# Patient Record
Sex: FEMALE | Race: WHITE | NOT HISPANIC OR LATINO | Employment: FULL TIME | URBAN - METROPOLITAN AREA
[De-identification: names, ages, dates, MRNs, and addresses within clinical notes are randomized per-mention and may not be internally consistent; named-entity substitution may affect disease eponyms.]

---

## 2013-08-16 LAB
CORTISOL: 0.5 MCG/DL (ref 4–22)
EXT 24 HR UR METANEPHRINE: ABNORMAL
EXT 24 HR UR NORMETANEPHRINE: ABNORMAL
EXT 24 HR UR NORMETANEPHRINE: ABNORMAL
EXT 25 HYDROXY VIT D2: ABNORMAL
EXT 25 HYDROXY VIT D3: ABNORMAL
EXT 5 HIAA 24 HR URINE: ABNORMAL
EXT 5 HIAA BLOOD: ABNORMAL
EXT ACTH: ABNORMAL
EXT AFP: ABNORMAL
EXT ALBUMIN: ABNORMAL
EXT ALKALINE PHOSPHATASE: ABNORMAL
EXT ALT: ABNORMAL
EXT AMYLASE: ABNORMAL
EXT ANTI ISLET CELL AB: ABNORMAL
EXT ANTI PARIETAL CELL AB: ABNORMAL
EXT ANTI THYROID AB: ABNORMAL
EXT AST: ABNORMAL
EXT BILIRUBIN DIRECT: ABNORMAL
EXT BILIRUBIN TOTAL: ABNORMAL
EXT BK VIRUS DNA QN PCR: ABNORMAL
EXT BUN: ABNORMAL
EXT C PEPTIDE: ABNORMAL
EXT CA 125: ABNORMAL
EXT CA 19-9: ABNORMAL
EXT CA 27-29: ABNORMAL
EXT CALCITONIN: ABNORMAL
EXT CALCIUM: ABNORMAL
EXT CEA: ABNORMAL
EXT CHLORIDE: ABNORMAL
EXT CHOLESTEROL: ABNORMAL
EXT CHROMOGRANIN A: ABNORMAL
EXT CO2: ABNORMAL
EXT CREATININE UA: ABNORMAL
EXT CREATININE: ABNORMAL
EXT CYCLOSPORONE LEVEL: ABNORMAL
EXT DOPAMINE: ABNORMAL
EXT EBV DNA BY PCR: ABNORMAL
EXT EPINEPHRINE: ABNORMAL
EXT FOLATE: ABNORMAL
EXT FREE T3: ABNORMAL
EXT FREE T4: ABNORMAL
EXT FSH: ABNORMAL
EXT GASTRIN RELEASING PEPTIDE: ABNORMAL
EXT GASTRIN RELEASING PEPTIDE: ABNORMAL
EXT GASTRIN: ABNORMAL
EXT GGT: ABNORMAL
EXT GHRELIN: ABNORMAL
EXT GLUCAGON: ABNORMAL
EXT GLUCOSE: ABNORMAL
EXT GROWTH HORMONE: ABNORMAL
EXT HCV RNA QUANT PCR: ABNORMAL
EXT HDL: ABNORMAL
EXT HEMATOCRIT: ABNORMAL
EXT HEMOGLOBIN A1C: ABNORMAL
EXT HEMOGLOBIN: ABNORMAL
EXT HISTAMINE 24 HR URINE: ABNORMAL
EXT HISTAMINE: ABNORMAL
EXT IGF-1: ABNORMAL
EXT IMMUNKNOW (NON-STIMULATED): ABNORMAL
EXT IMMUNKNOW (STIMULATED): ABNORMAL
EXT INR: ABNORMAL
EXT INSULIN: ABNORMAL
EXT LANREOTIDE LEVEL: ABNORMAL
EXT LDH, TOTAL: ABNORMAL
EXT LDL CHOLESTEROL: ABNORMAL
EXT LIPASE: ABNORMAL
EXT MAGNESIUM: ABNORMAL
EXT METANEPHRINE FREE PLASMA: 911 PG/ML (ref 0–205)
EXT MOTILIN: ABNORMAL
EXT NEUROKININ A CAMB: ABNORMAL
EXT NEUROKININ A ISI: ABNORMAL
EXT NEUROTENSIN: ABNORMAL
EXT NOREPINEPHRINE: ABNORMAL
EXT NORMETANEPHRINE: 911 PG/ML (ref 0–148)
EXT NSE: ABNORMAL
EXT OCTREOTIDE LEVEL: ABNORMAL
EXT PANCREASTATIN CAMB: ABNORMAL
EXT PANCREASTATIN ISI: ABNORMAL
EXT PANCREATIC POLYPEPTIDE: ABNORMAL
EXT PHOSPHORUS: ABNORMAL
EXT PLATELETS: ABNORMAL
EXT POTASSIUM: ABNORMAL
EXT PROGRAF LEVEL: ABNORMAL
EXT PROLACTIN: ABNORMAL
EXT PROTEIN TOTAL: ABNORMAL
EXT PROTEIN UA: ABNORMAL
EXT PT: ABNORMAL
EXT PTH, INTACT: ABNORMAL
EXT PTT: ABNORMAL
EXT RAPAMUNE LEVEL: ABNORMAL
EXT SEROTONIN: ABNORMAL
EXT SODIUM: ABNORMAL
EXT SOMATOSTATIN: ABNORMAL
EXT SUBSTANCE P: ABNORMAL
EXT TRIGLYCERIDES: ABNORMAL
EXT TRYPTASE: ABNORMAL
EXT TSH: ABNORMAL
EXT URIC ACID: ABNORMAL
EXT URINE AMYLASE U/HR: ABNORMAL
EXT URINE AMYLASE U/L: ABNORMAL
EXT VASOACTIVE INTESTINAL POLYPEPTIDE: ABNORMAL
EXT VITAMIN B12: ABNORMAL
EXT VMA 24 HR URINE: ABNORMAL
EXT WBC: ABNORMAL
Lab: <25 PG/ML (ref 0–57)
NEURON SPECIFIC ENOLASE: ABNORMAL

## 2014-01-13 LAB

## 2016-09-01 LAB
ACTH, PLASMA: 31.8 PG/ML (ref 7.2–63.3)
CORTISOL: 11 UG/DL (ref 8–19)
EXT 24 HR UR METANEPHRINE: ABNORMAL
EXT 24 HR UR NORMETANEPHRINE: ABNORMAL
EXT 24 HR UR NORMETANEPHRINE: ABNORMAL
EXT 25 HYDROXY VIT D2: ABNORMAL
EXT 25 HYDROXY VIT D3: ABNORMAL
EXT 5 HIAA 24 HR URINE: ABNORMAL
EXT 5 HIAA BLOOD: ABNORMAL
EXT ACTH: ABNORMAL
EXT AFP: ABNORMAL
EXT ALBUMIN: 4.4 G/DL (ref 3.5–5.5)
EXT ALKALINE PHOSPHATASE: 65 IU/L (ref 39–117)
EXT ALT: 40 IU/L (ref 0–32)
EXT AMYLASE: ABNORMAL
EXT ANTI ISLET CELL AB: ABNORMAL
EXT ANTI PARIETAL CELL AB: ABNORMAL
EXT ANTI THYROID AB: ABNORMAL
EXT AST: 30 IU/L (ref 0–40)
EXT BILIRUBIN DIRECT: ABNORMAL
EXT BILIRUBIN TOTAL: 0.2 MG/DL (ref 0–1.2)
EXT BK VIRUS DNA QN PCR: ABNORMAL
EXT BUN: 14 MG/DL (ref 6–24)
EXT C PEPTIDE: ABNORMAL
EXT CA 125: ABNORMAL
EXT CA 19-9: ABNORMAL
EXT CA 27-29: ABNORMAL
EXT CALCITONIN: ABNORMAL
EXT CALCIUM: 9.1 MG/DL (ref 8.7–10.2)
EXT CEA: ABNORMAL
EXT CHLORIDE: 100 MMOL/L (ref 97–108)
EXT CHOLESTEROL: 205 MG/DL (ref 100–199)
EXT CHROMOGRANIN A: ABNORMAL
EXT CO2: 25 MMOL/L (ref 18–29)
EXT CREATININE UA: ABNORMAL
EXT CREATININE: 0.59 MG/DL (ref 0.57–1)
EXT CYCLOSPORONE LEVEL: ABNORMAL
EXT DOPAMINE: ABNORMAL
EXT EBV DNA BY PCR: ABNORMAL
EXT EPINEPHRINE: ABNORMAL
EXT FOLATE: ABNORMAL
EXT FREE T3: 2.13 PG/ML (ref 1.81–4.06)
EXT FREE T4: 1.22 NG/DL (ref 0.82–1.77)
EXT FSH: ABNORMAL
EXT GASTRIN RELEASING PEPTIDE: ABNORMAL
EXT GASTRIN RELEASING PEPTIDE: ABNORMAL
EXT GASTRIN: ABNORMAL
EXT GGT: ABNORMAL
EXT GHRELIN: ABNORMAL
EXT GLUCAGON: ABNORMAL
EXT GLUCOSE: 123 MG/DL (ref 65–99)
EXT GROWTH HORMONE: ABNORMAL
EXT HCV RNA QUANT PCR: ABNORMAL
EXT HDL: 42 MG/DL (ref 39–?)
EXT HEMATOCRIT: ABNORMAL
EXT HEMOGLOBIN A1C: ABNORMAL
EXT HEMOGLOBIN: ABNORMAL
EXT HISTAMINE 24 HR URINE: ABNORMAL
EXT HISTAMINE: ABNORMAL
EXT IGF-1: ABNORMAL
EXT IMMUNKNOW (NON-STIMULATED): ABNORMAL
EXT IMMUNKNOW (STIMULATED): ABNORMAL
EXT INR: ABNORMAL
EXT INSULIN: ABNORMAL
EXT LANREOTIDE LEVEL: ABNORMAL
EXT LDH, TOTAL: ABNORMAL
EXT LDL CHOLESTEROL: 117 MG/DL (ref 0–99)
EXT LIPASE: ABNORMAL
EXT MAGNESIUM: ABNORMAL
EXT METANEPHRINE FREE PLASMA: <10 PG/ML (ref 0–62)
EXT MOTILIN: ABNORMAL
EXT NEUROKININ A CAMB: ABNORMAL
EXT NEUROKININ A ISI: ABNORMAL
EXT NEUROTENSIN: ABNORMAL
EXT NOREPINEPHRINE: ABNORMAL
EXT NORMETANEPHRINE: 30 PG/ML (ref 0–145)
EXT NSE: ABNORMAL
EXT OCTREOTIDE LEVEL: ABNORMAL
EXT PANCREASTATIN CAMB: ABNORMAL
EXT PANCREASTATIN ISI: ABNORMAL
EXT PANCREATIC POLYPEPTIDE: ABNORMAL
EXT PHOSPHORUS: ABNORMAL
EXT PLATELETS: ABNORMAL
EXT POTASSIUM: 4.6 MMOL/L (ref 3.5–5.2)
EXT PROGRAF LEVEL: ABNORMAL
EXT PROLACTIN: ABNORMAL
EXT PROTEIN TOTAL: 6.9 G/DL (ref 6–8.5)
EXT PROTEIN UA: ABNORMAL
EXT PT: ABNORMAL
EXT PTH, INTACT: ABNORMAL
EXT PTT: ABNORMAL
EXT RAPAMUNE LEVEL: ABNORMAL
EXT SEROTONIN: ABNORMAL
EXT SODIUM: 142 MMOL/L (ref 134–144)
EXT SOMATOSTATIN: ABNORMAL
EXT SUBSTANCE P: ABNORMAL
EXT TRIGLYCERIDES: 232 MG/DL (ref 0–149)
EXT TRYPTASE: ABNORMAL
EXT TSH: ABNORMAL
EXT URIC ACID: ABNORMAL
EXT URINE AMYLASE U/HR: ABNORMAL
EXT URINE AMYLASE U/L: ABNORMAL
EXT VASOACTIVE INTESTINAL POLYPEPTIDE: ABNORMAL
EXT VITAMIN B12: ABNORMAL
EXT VMA 24 HR URINE: ABNORMAL
EXT WBC: ABNORMAL
INSULIN AB SER QL: 28.1 UIU/ML (ref 2.6–24.9)
Lab: 0.08 UG/DL (ref 0.03–0.6)
Lab: 222 NG/DL (ref 21–402)
NEURON SPECIFIC ENOLASE: ABNORMAL
THYROPEROXIDASE AB SERPL-ACNC: 13 IU/ML (ref 0–34)
TSH RECEPTOR ANTIBODY: <0.3 U/L (ref 0–1)

## 2021-07-23 LAB
EXT 24 HR UR METANEPHRINE: ABNORMAL
EXT 24 HR UR NORMETANEPHRINE: ABNORMAL
EXT 24 HR UR NORMETANEPHRINE: ABNORMAL
EXT 25 HYDROXY VIT D2: ABNORMAL
EXT 25 HYDROXY VIT D3: ABNORMAL
EXT 5 HIAA 24 HR URINE: ABNORMAL
EXT 5 HIAA BLOOD: ABNORMAL
EXT ACTH: ABNORMAL
EXT AFP: ABNORMAL
EXT ALBUMIN: 3.9 G/DL (ref 3.6–5.1)
EXT ALKALINE PHOSPHATASE: 69 U/L (ref 37–153)
EXT ALT: 23 U/L (ref 6–29)
EXT AMYLASE: ABNORMAL
EXT ANTI ISLET CELL AB: ABNORMAL
EXT ANTI PARIETAL CELL AB: ABNORMAL
EXT ANTI THYROID AB: ABNORMAL
EXT AST: 18 U/L (ref 10–35)
EXT BILIRUBIN DIRECT: ABNORMAL
EXT BILIRUBIN TOTAL: 0.4 MG/DL (ref 0.2–1.2)
EXT BK VIRUS DNA QN PCR: ABNORMAL
EXT BUN: 12 MG/DL (ref 7–25)
EXT C PEPTIDE: ABNORMAL
EXT CA 125: ABNORMAL
EXT CA 19-9: ABNORMAL
EXT CA 27-29: ABNORMAL
EXT CALCITONIN: ABNORMAL
EXT CALCIUM: 9.3 MG/DL (ref 8.6–10.4)
EXT CEA: ABNORMAL
EXT CHLORIDE: 103 MMOL/L (ref 98–110)
EXT CHOLESTEROL: 201 MG/DL (ref 0–200)
EXT CHROMOGRANIN A: ABNORMAL
EXT CO2: 31 MMOL/L (ref 20–32)
EXT CREATININE UA: ABNORMAL
EXT CREATININE: 0.52 MG/DL (ref 0.5–1.05)
EXT CYCLOSPORONE LEVEL: ABNORMAL
EXT DOPAMINE: ABNORMAL
EXT EBV DNA BY PCR: ABNORMAL
EXT EPINEPHRINE: ABNORMAL
EXT FOLATE: ABNORMAL
EXT FREE T3: 3.5 PG/ML (ref 2.3–4.2)
EXT FREE T4: 1.3 NG/DL (ref 0.8–1.8)
EXT FSH: 38.6 MIU/ML
EXT GASTRIN RELEASING PEPTIDE: ABNORMAL
EXT GASTRIN RELEASING PEPTIDE: ABNORMAL
EXT GASTRIN: ABNORMAL
EXT GGT: ABNORMAL
EXT GHRELIN: ABNORMAL
EXT GLUCAGON: ABNORMAL
EXT GLUCOSE: 103 MG/DL (ref 65–99)
EXT GROWTH HORMONE: ABNORMAL
EXT HCV RNA QUANT PCR: ABNORMAL
EXT HDL: 50 MG/DL (ref 50–?)
EXT HEMATOCRIT: 42.6 % (ref 35–45)
EXT HEMOGLOBIN A1C: 6.1 % (ref 0–5.7)
EXT HEMOGLOBIN: 14.3 G/DL (ref 11.7–15.5)
EXT HISTAMINE 24 HR URINE: ABNORMAL
EXT HISTAMINE: ABNORMAL
EXT IGF-1: ABNORMAL
EXT IMMUNKNOW (NON-STIMULATED): ABNORMAL
EXT IMMUNKNOW (STIMULATED): ABNORMAL
EXT INR: ABNORMAL
EXT INSULIN: ABNORMAL
EXT LANREOTIDE LEVEL: ABNORMAL
EXT LDH, TOTAL: ABNORMAL
EXT LDL CHOLESTEROL: 127 MG/DL (ref 0–100)
EXT LIPASE: ABNORMAL
EXT MAGNESIUM: ABNORMAL
EXT METANEPHRINE FREE PLASMA: <25 PG/ML (ref 0–57)
EXT MOTILIN: ABNORMAL
EXT NEUROKININ A CAMB: ABNORMAL
EXT NEUROKININ A ISI: ABNORMAL
EXT NEUROTENSIN: ABNORMAL
EXT NOREPINEPHRINE: ABNORMAL
EXT NORMETANEPHRINE: 327 PG/ML (ref 0–148)
EXT NSE: ABNORMAL
EXT OCTREOTIDE LEVEL: ABNORMAL
EXT PANCREASTATIN CAMB: ABNORMAL
EXT PANCREASTATIN ISI: ABNORMAL
EXT PANCREATIC POLYPEPTIDE: ABNORMAL
EXT PHOSPHORUS: ABNORMAL
EXT PLATELETS: 282 1000/UL (ref 140–400)
EXT POTASSIUM: 4.4 MMOL/L (ref 3.5–5.3)
EXT PROGRAF LEVEL: ABNORMAL
EXT PROLACTIN: ABNORMAL
EXT PROTEIN TOTAL: 6.6 G/DL (ref 6.1–8.1)
EXT PROTEIN UA: ABNORMAL
EXT PT: ABNORMAL
EXT PTH, INTACT: ABNORMAL
EXT PTT: ABNORMAL
EXT RAPAMUNE LEVEL: ABNORMAL
EXT SEROTONIN: ABNORMAL
EXT SODIUM: 141 MMOL/L (ref 135–146)
EXT SOMATOSTATIN: ABNORMAL
EXT SUBSTANCE P: ABNORMAL
EXT TRIGLYCERIDES: 126 MG/DL (ref 0–150)
EXT TRYPTASE: ABNORMAL
EXT TSH: 1.66 MIU/L (ref 0.4–4.5)
EXT URIC ACID: ABNORMAL
EXT URINE AMYLASE U/HR: ABNORMAL
EXT URINE AMYLASE U/L: ABNORMAL
EXT VASOACTIVE INTESTINAL POLYPEPTIDE: ABNORMAL
EXT VITAMIN B12: 733 PG/ML (ref 200–1100)
EXT VMA 24 HR URINE: ABNORMAL
EXT WBC: 7.1 1000/UL (ref 3.8–10.8)
LH: 18.7 MIU/ML
NEURON SPECIFIC ENOLASE: ABNORMAL
TESTOST FREE SERPL-MCNC: 2.6 PG/ML (ref 0.1–6.4)
TESTOST SERPL-MCNC: 27 NG/DL (ref 2–45)
THYROGLOB SERPL-MCNC: 5.8 NG/ML (ref 2.8–40.9)
THYROGLOBULIN ANTIBODIES: <1 IU/ML (ref 0–1)
THYROPEROXIDASE AB SERPL-ACNC: 1 IU/ML (ref 0–9)
VITAMIN D, 25-OH, TOTAL: 37 NG/ML (ref 30–100)

## 2021-08-09 LAB
EXT 24 HR UR METANEPHRINE: 52 (ref 90–315)
EXT 24 HR UR NORMETANEPHRINE: ABNORMAL
EXT 24 HR UR NORMETANEPHRINE: ABNORMAL
EXT 25 HYDROXY VIT D2: ABNORMAL
EXT 25 HYDROXY VIT D3: ABNORMAL
EXT 5 HIAA 24 HR URINE: ABNORMAL
EXT 5 HIAA BLOOD: ABNORMAL
EXT ACTH: ABNORMAL
EXT AFP: ABNORMAL
EXT ALBUMIN: ABNORMAL
EXT ALKALINE PHOSPHATASE: ABNORMAL
EXT ALT: ABNORMAL
EXT AMYLASE: ABNORMAL
EXT ANTI ISLET CELL AB: ABNORMAL
EXT ANTI PARIETAL CELL AB: ABNORMAL
EXT ANTI THYROID AB: ABNORMAL
EXT AST: ABNORMAL
EXT BILIRUBIN DIRECT: ABNORMAL
EXT BILIRUBIN TOTAL: ABNORMAL
EXT BK VIRUS DNA QN PCR: ABNORMAL
EXT BUN: ABNORMAL
EXT C PEPTIDE: ABNORMAL
EXT CA 125: ABNORMAL
EXT CA 19-9: ABNORMAL
EXT CA 27-29: ABNORMAL
EXT CALCITONIN: ABNORMAL
EXT CALCIUM: ABNORMAL
EXT CEA: ABNORMAL
EXT CHLORIDE: ABNORMAL
EXT CHOLESTEROL: ABNORMAL
EXT CHROMOGRANIN A: ABNORMAL
EXT CO2: ABNORMAL
EXT CREATININE UA: ABNORMAL
EXT CREATININE: ABNORMAL
EXT CYCLOSPORONE LEVEL: ABNORMAL
EXT DOPAMINE: ABNORMAL
EXT EBV DNA BY PCR: ABNORMAL
EXT EPINEPHRINE: ABNORMAL
EXT FOLATE: ABNORMAL
EXT FREE T3: ABNORMAL
EXT FREE T4: ABNORMAL
EXT FSH: ABNORMAL
EXT GASTRIN RELEASING PEPTIDE: ABNORMAL
EXT GASTRIN RELEASING PEPTIDE: ABNORMAL
EXT GASTRIN: ABNORMAL
EXT GGT: ABNORMAL
EXT GHRELIN: ABNORMAL
EXT GLUCAGON: ABNORMAL
EXT GLUCOSE: ABNORMAL
EXT GROWTH HORMONE: ABNORMAL
EXT HCV RNA QUANT PCR: ABNORMAL
EXT HDL: ABNORMAL
EXT HEMATOCRIT: ABNORMAL
EXT HEMOGLOBIN A1C: ABNORMAL
EXT HEMOGLOBIN: ABNORMAL
EXT HISTAMINE 24 HR URINE: ABNORMAL
EXT HISTAMINE: ABNORMAL
EXT IGF-1: ABNORMAL
EXT IMMUNKNOW (NON-STIMULATED): ABNORMAL
EXT IMMUNKNOW (STIMULATED): ABNORMAL
EXT INR: ABNORMAL
EXT INSULIN: ABNORMAL
EXT LANREOTIDE LEVEL: ABNORMAL
EXT LDH, TOTAL: ABNORMAL
EXT LDL CHOLESTEROL: ABNORMAL
EXT LIPASE: ABNORMAL
EXT MAGNESIUM: ABNORMAL
EXT METANEPHRINE FREE PLASMA: ABNORMAL
EXT MOTILIN: ABNORMAL
EXT NEUROKININ A CAMB: ABNORMAL
EXT NEUROKININ A ISI: ABNORMAL
EXT NEUROTENSIN: ABNORMAL
EXT NOREPINEPHRINE: ABNORMAL
EXT NORMETANEPHRINE: 970 (ref 122–676)
EXT NSE: ABNORMAL
EXT OCTREOTIDE LEVEL: ABNORMAL
EXT PANCREASTATIN CAMB: ABNORMAL
EXT PANCREASTATIN ISI: ABNORMAL
EXT PANCREATIC POLYPEPTIDE: ABNORMAL
EXT PHOSPHORUS: ABNORMAL
EXT PLATELETS: ABNORMAL
EXT POTASSIUM: ABNORMAL
EXT PROGRAF LEVEL: ABNORMAL
EXT PROLACTIN: ABNORMAL
EXT PROTEIN TOTAL: ABNORMAL
EXT PROTEIN UA: ABNORMAL
EXT PT: ABNORMAL
EXT PTH, INTACT: ABNORMAL
EXT PTT: ABNORMAL
EXT RAPAMUNE LEVEL: ABNORMAL
EXT SEROTONIN: ABNORMAL
EXT SODIUM: ABNORMAL
EXT SOMATOSTATIN: ABNORMAL
EXT SUBSTANCE P: ABNORMAL
EXT TRIGLYCERIDES: ABNORMAL
EXT TRYPTASE: ABNORMAL
EXT TSH: ABNORMAL
EXT URIC ACID: ABNORMAL
EXT URINE AMYLASE U/HR: ABNORMAL
EXT URINE AMYLASE U/L: ABNORMAL
EXT VASOACTIVE INTESTINAL POLYPEPTIDE: ABNORMAL
EXT VITAMIN B12: ABNORMAL
EXT VMA 24 HR URINE: ABNORMAL
EXT WBC: ABNORMAL
Lab: 1000 ML
Lab: 1022 (ref 224–832)
NEURON SPECIFIC ENOLASE: ABNORMAL

## 2021-08-10 LAB
ACTH, PLASMA: <5 PG/ML (ref 6–50)
CORTISOL: 0.9 MCG/DL (ref 4–22)
EXT 24 HR UR METANEPHRINE: ABNORMAL
EXT 24 HR UR NORMETANEPHRINE: ABNORMAL
EXT 24 HR UR NORMETANEPHRINE: ABNORMAL
EXT 25 HYDROXY VIT D2: ABNORMAL
EXT 25 HYDROXY VIT D3: ABNORMAL
EXT 5 HIAA 24 HR URINE: ABNORMAL
EXT 5 HIAA BLOOD: ABNORMAL
EXT ACTH: ABNORMAL
EXT AFP: ABNORMAL
EXT ALBUMIN: ABNORMAL
EXT ALKALINE PHOSPHATASE: ABNORMAL
EXT ALT: ABNORMAL
EXT AMYLASE: ABNORMAL
EXT ANTI ISLET CELL AB: ABNORMAL
EXT ANTI PARIETAL CELL AB: ABNORMAL
EXT ANTI THYROID AB: ABNORMAL
EXT AST: ABNORMAL
EXT BILIRUBIN DIRECT: ABNORMAL
EXT BILIRUBIN TOTAL: ABNORMAL
EXT BK VIRUS DNA QN PCR: ABNORMAL
EXT BUN: ABNORMAL
EXT C PEPTIDE: ABNORMAL
EXT CA 125: ABNORMAL
EXT CA 19-9: ABNORMAL
EXT CA 27-29: ABNORMAL
EXT CALCITONIN: ABNORMAL
EXT CALCIUM: ABNORMAL
EXT CEA: ABNORMAL
EXT CHLORIDE: ABNORMAL
EXT CHOLESTEROL: ABNORMAL
EXT CHROMOGRANIN A: ABNORMAL
EXT CO2: ABNORMAL
EXT CREATININE UA: ABNORMAL
EXT CREATININE: ABNORMAL
EXT CYCLOSPORONE LEVEL: ABNORMAL
EXT DOPAMINE: ABNORMAL
EXT EBV DNA BY PCR: ABNORMAL
EXT EPINEPHRINE: ABNORMAL
EXT FOLATE: ABNORMAL
EXT FREE T3: ABNORMAL
EXT FREE T4: ABNORMAL
EXT FSH: ABNORMAL
EXT GASTRIN RELEASING PEPTIDE: ABNORMAL
EXT GASTRIN RELEASING PEPTIDE: ABNORMAL
EXT GASTRIN: ABNORMAL
EXT GGT: ABNORMAL
EXT GHRELIN: ABNORMAL
EXT GLUCAGON: ABNORMAL
EXT GLUCOSE: ABNORMAL
EXT GROWTH HORMONE: ABNORMAL
EXT HCV RNA QUANT PCR: ABNORMAL
EXT HDL: ABNORMAL
EXT HEMATOCRIT: ABNORMAL
EXT HEMOGLOBIN A1C: ABNORMAL
EXT HEMOGLOBIN: ABNORMAL
EXT HISTAMINE 24 HR URINE: ABNORMAL
EXT HISTAMINE: ABNORMAL
EXT IGF-1: ABNORMAL
EXT IMMUNKNOW (NON-STIMULATED): ABNORMAL
EXT IMMUNKNOW (STIMULATED): ABNORMAL
EXT INR: ABNORMAL
EXT INSULIN: ABNORMAL
EXT LANREOTIDE LEVEL: ABNORMAL
EXT LDH, TOTAL: ABNORMAL
EXT LDL CHOLESTEROL: ABNORMAL
EXT LIPASE: ABNORMAL
EXT MAGNESIUM: ABNORMAL
EXT METANEPHRINE FREE PLASMA: ABNORMAL
EXT MOTILIN: ABNORMAL
EXT NEUROKININ A CAMB: ABNORMAL
EXT NEUROKININ A ISI: ABNORMAL
EXT NEUROTENSIN: ABNORMAL
EXT NOREPINEPHRINE: ABNORMAL
EXT NORMETANEPHRINE: ABNORMAL
EXT NSE: ABNORMAL
EXT OCTREOTIDE LEVEL: ABNORMAL
EXT PANCREASTATIN CAMB: ABNORMAL
EXT PANCREASTATIN ISI: ABNORMAL
EXT PANCREATIC POLYPEPTIDE: ABNORMAL
EXT PHOSPHORUS: ABNORMAL
EXT PLATELETS: ABNORMAL
EXT POTASSIUM: ABNORMAL
EXT PROGRAF LEVEL: ABNORMAL
EXT PROLACTIN: ABNORMAL
EXT PROTEIN TOTAL: ABNORMAL
EXT PROTEIN UA: ABNORMAL
EXT PT: ABNORMAL
EXT PTH, INTACT: ABNORMAL
EXT PTT: ABNORMAL
EXT RAPAMUNE LEVEL: ABNORMAL
EXT SEROTONIN: ABNORMAL
EXT SODIUM: ABNORMAL
EXT SOMATOSTATIN: ABNORMAL
EXT SUBSTANCE P: ABNORMAL
EXT TRIGLYCERIDES: ABNORMAL
EXT TRYPTASE: ABNORMAL
EXT TSH: ABNORMAL
EXT URIC ACID: ABNORMAL
EXT URINE AMYLASE U/HR: ABNORMAL
EXT URINE AMYLASE U/L: ABNORMAL
EXT VASOACTIVE INTESTINAL POLYPEPTIDE: ABNORMAL
EXT VITAMIN B12: ABNORMAL
EXT VMA 24 HR URINE: ABNORMAL
EXT WBC: ABNORMAL
NEURON SPECIFIC ENOLASE: ABNORMAL

## 2021-08-11 ENCOUNTER — IMMUNIZATION (OUTPATIENT)
Dept: FAMILY MEDICINE | Facility: CLINIC | Age: 56
End: 2021-08-11
Payer: COMMERCIAL

## 2021-08-11 DIAGNOSIS — Z23 NEED FOR VACCINATION: Primary | ICD-10-CM

## 2021-08-11 PROCEDURE — 91300 COVID-19, MRNA, LNP-S, PF, 30 MCG/0.3 ML DOSE VACCINE: ICD-10-PCS | Mod: ,,, | Performed by: FAMILY MEDICINE

## 2021-08-11 PROCEDURE — 91300 COVID-19, MRNA, LNP-S, PF, 30 MCG/0.3 ML DOSE VACCINE: CPT | Mod: ,,, | Performed by: FAMILY MEDICINE

## 2021-08-11 PROCEDURE — 0001A COVID-19, MRNA, LNP-S, PF, 30 MCG/0.3 ML DOSE VACCINE: ICD-10-PCS | Mod: CV19,,, | Performed by: FAMILY MEDICINE

## 2021-08-11 PROCEDURE — 0001A COVID-19, MRNA, LNP-S, PF, 30 MCG/0.3 ML DOSE VACCINE: CPT | Mod: CV19,,, | Performed by: FAMILY MEDICINE

## 2021-08-23 LAB
ANDROST SERPL-MCNC: 74 NG/DL
DHEA-SULFATE: 41 MCG/DL (ref 8–188)
EXT 24 HR UR METANEPHRINE: ABNORMAL
EXT 24 HR UR NORMETANEPHRINE: ABNORMAL
EXT 24 HR UR NORMETANEPHRINE: ABNORMAL
EXT 25 HYDROXY VIT D2: ABNORMAL
EXT 25 HYDROXY VIT D3: ABNORMAL
EXT 5 HIAA 24 HR URINE: ABNORMAL
EXT 5 HIAA BLOOD: ABNORMAL
EXT ACTH: ABNORMAL
EXT AFP: ABNORMAL
EXT ALBUMIN: 4.1 G/DL (ref 3.6–5.1)
EXT ALKALINE PHOSPHATASE: 72 U/L (ref 37–153)
EXT ALT: 24 U/L (ref 6–29)
EXT AMYLASE: ABNORMAL
EXT ANTI ISLET CELL AB: ABNORMAL
EXT ANTI PARIETAL CELL AB: ABNORMAL
EXT ANTI THYROID AB: ABNORMAL
EXT AST: 18 U/L (ref 10–35)
EXT BILIRUBIN DIRECT: 0.3 MG/DL (ref 0.2–1.2)
EXT BILIRUBIN TOTAL: ABNORMAL
EXT BK VIRUS DNA QN PCR: ABNORMAL
EXT BUN: 15 MG/DL (ref 7–25)
EXT C PEPTIDE: ABNORMAL
EXT CA 125: ABNORMAL
EXT CA 19-9: ABNORMAL
EXT CA 27-29: ABNORMAL
EXT CALCITONIN: ABNORMAL
EXT CALCIUM: 9.3 MG/DL (ref 8.6–10.4)
EXT CEA: ABNORMAL
EXT CHLORIDE: 101 MMOL/L (ref 98–110)
EXT CHOLESTEROL: ABNORMAL
EXT CHROMOGRANIN A: 484 NG/ML (ref 0–311)
EXT CO2: 32 MMOL/L (ref 20–32)
EXT CREATININE UA: ABNORMAL
EXT CREATININE: 0.61 MG/DL (ref 0.5–1.05)
EXT CYCLOSPORONE LEVEL: ABNORMAL
EXT DOPAMINE: ABNORMAL
EXT EBV DNA BY PCR: ABNORMAL
EXT EPINEPHRINE: ABNORMAL
EXT FOLATE: ABNORMAL
EXT FREE T3: ABNORMAL
EXT FREE T4: ABNORMAL
EXT FSH: ABNORMAL
EXT GASTRIN RELEASING PEPTIDE: ABNORMAL
EXT GASTRIN RELEASING PEPTIDE: ABNORMAL
EXT GASTRIN: ABNORMAL
EXT GGT: ABNORMAL
EXT GHRELIN: ABNORMAL
EXT GLUCAGON: ABNORMAL
EXT GLUCOSE: 108 MG/DL (ref 65–99)
EXT GROWTH HORMONE: ABNORMAL
EXT HCV RNA QUANT PCR: ABNORMAL
EXT HDL: ABNORMAL
EXT HEMATOCRIT: ABNORMAL
EXT HEMOGLOBIN A1C: ABNORMAL
EXT HEMOGLOBIN: ABNORMAL
EXT HISTAMINE 24 HR URINE: ABNORMAL
EXT HISTAMINE: ABNORMAL
EXT IGF-1: ABNORMAL
EXT IMMUNKNOW (NON-STIMULATED): ABNORMAL
EXT IMMUNKNOW (STIMULATED): ABNORMAL
EXT INR: ABNORMAL
EXT INSULIN: ABNORMAL
EXT LANREOTIDE LEVEL: ABNORMAL
EXT LDH, TOTAL: ABNORMAL
EXT LDL CHOLESTEROL: ABNORMAL
EXT LIPASE: ABNORMAL
EXT MAGNESIUM: ABNORMAL
EXT METANEPHRINE FREE PLASMA: ABNORMAL
EXT MOTILIN: ABNORMAL
EXT NEUROKININ A CAMB: ABNORMAL
EXT NEUROKININ A ISI: ABNORMAL
EXT NEUROTENSIN: ABNORMAL
EXT NOREPINEPHRINE: ABNORMAL
EXT NORMETANEPHRINE: ABNORMAL
EXT NSE: ABNORMAL
EXT OCTREOTIDE LEVEL: ABNORMAL
EXT PANCREASTATIN CAMB: ABNORMAL
EXT PANCREASTATIN ISI: ABNORMAL
EXT PANCREATIC POLYPEPTIDE: ABNORMAL
EXT PHOSPHORUS: ABNORMAL
EXT PLATELETS: ABNORMAL
EXT POTASSIUM: 4.6 MMOL/L (ref 3.5–5.3)
EXT PROGRAF LEVEL: ABNORMAL
EXT PROLACTIN: ABNORMAL
EXT PROTEIN TOTAL: 6.9 G/DL (ref 6.1–8.1)
EXT PROTEIN UA: ABNORMAL
EXT PT: ABNORMAL
EXT PTH, INTACT: ABNORMAL
EXT PTT: ABNORMAL
EXT RAPAMUNE LEVEL: ABNORMAL
EXT SEROTONIN: ABNORMAL
EXT SODIUM: 142 MMOL/L (ref 135–146)
EXT SOMATOSTATIN: ABNORMAL
EXT SUBSTANCE P: ABNORMAL
EXT TRIGLYCERIDES: ABNORMAL
EXT TRYPTASE: ABNORMAL
EXT TSH: ABNORMAL
EXT URIC ACID: ABNORMAL
EXT URINE AMYLASE U/HR: ABNORMAL
EXT URINE AMYLASE U/L: ABNORMAL
EXT VASOACTIVE INTESTINAL POLYPEPTIDE: ABNORMAL
EXT VITAMIN B12: ABNORMAL
EXT VMA 24 HR URINE: ABNORMAL
EXT WBC: ABNORMAL
NEURON SPECIFIC ENOLASE: ABNORMAL
VITAMIN D, 25-OH, TOTAL: 43 NG/ML (ref 30–100)

## 2021-09-11 ENCOUNTER — TELEPHONE (OUTPATIENT)
Dept: FAMILY MEDICINE | Facility: CLINIC | Age: 56
End: 2021-09-11

## 2021-09-15 ENCOUNTER — TELEPHONE (OUTPATIENT)
Dept: NEUROLOGY | Facility: HOSPITAL | Age: 56
End: 2021-09-15

## 2021-09-15 ENCOUNTER — IMMUNIZATION (OUTPATIENT)
Dept: FAMILY MEDICINE | Facility: CLINIC | Age: 56
End: 2021-09-15
Payer: COMMERCIAL

## 2021-09-15 DIAGNOSIS — C74.92: Primary | ICD-10-CM

## 2021-09-15 DIAGNOSIS — Z23 NEED FOR VACCINATION: Primary | ICD-10-CM

## 2021-09-15 PROCEDURE — 0002A COVID-19, MRNA, LNP-S, PF, 30 MCG/0.3 ML DOSE VACCINE: ICD-10-PCS | Mod: CV19,,, | Performed by: FAMILY MEDICINE

## 2021-09-15 PROCEDURE — 91300 COVID-19, MRNA, LNP-S, PF, 30 MCG/0.3 ML DOSE VACCINE: ICD-10-PCS | Mod: ,,, | Performed by: FAMILY MEDICINE

## 2021-09-15 PROCEDURE — 0002A COVID-19, MRNA, LNP-S, PF, 30 MCG/0.3 ML DOSE VACCINE: CPT | Mod: CV19,,, | Performed by: FAMILY MEDICINE

## 2021-09-15 PROCEDURE — 91300 COVID-19, MRNA, LNP-S, PF, 30 MCG/0.3 ML DOSE VACCINE: CPT | Mod: ,,, | Performed by: FAMILY MEDICINE

## 2021-09-16 ENCOUNTER — TELEPHONE (OUTPATIENT)
Dept: NEUROLOGY | Facility: HOSPITAL | Age: 56
End: 2021-09-16

## 2021-10-06 RX ORDER — LISINOPRIL 20 MG/1
TABLET ORAL
Status: ON HOLD | COMMUNITY
Start: 2021-09-14 | End: 2021-10-29 | Stop reason: HOSPADM

## 2021-10-06 RX ORDER — ACETAMINOPHEN 500 MG
TABLET ORAL
COMMUNITY

## 2021-10-06 RX ORDER — LEVOTHYROXINE SODIUM 50 UG/1
TABLET ORAL
COMMUNITY

## 2021-10-06 RX ORDER — DIAPER,BRIEF,ADULT, DISPOSABLE
EACH MISCELLANEOUS
COMMUNITY
End: 2022-01-21

## 2021-10-06 RX ORDER — ESCITALOPRAM OXALATE 10 MG/1
10 TABLET ORAL DAILY
COMMUNITY
Start: 2021-04-21 | End: 2021-10-20

## 2021-10-06 RX ORDER — HYDROCODONE BITARTRATE AND ACETAMINOPHEN 10; 325 MG/1; MG/1
TABLET ORAL
Status: ON HOLD | COMMUNITY
End: 2021-10-29 | Stop reason: HOSPADM

## 2021-10-08 ENCOUNTER — PATIENT MESSAGE (OUTPATIENT)
Dept: NEUROLOGY | Facility: HOSPITAL | Age: 56
End: 2021-10-08

## 2021-10-08 ENCOUNTER — OFFICE VISIT (OUTPATIENT)
Dept: NEUROLOGY | Facility: HOSPITAL | Age: 56
End: 2021-10-08
Attending: SURGERY
Payer: COMMERCIAL

## 2021-10-08 ENCOUNTER — HOSPITAL ENCOUNTER (OUTPATIENT)
Dept: RADIOLOGY | Facility: HOSPITAL | Age: 56
Discharge: HOME | End: 2021-10-08
Attending: SURGERY
Payer: COMMERCIAL

## 2021-10-08 VITALS
BODY MASS INDEX: 41.42 KG/M2 | TEMPERATURE: 98 F | HEART RATE: 75 BPM | WEIGHT: 225.06 LBS | DIASTOLIC BLOOD PRESSURE: 90 MMHG | SYSTOLIC BLOOD PRESSURE: 156 MMHG | HEIGHT: 62 IN

## 2021-10-08 DIAGNOSIS — D35.00 PHEOCHROMOCYTOMA, UNSPECIFIED LATERALITY: Primary | ICD-10-CM

## 2021-10-08 DIAGNOSIS — C74.92: ICD-10-CM

## 2021-10-08 PROCEDURE — 78815 NM PET 68GA DOTATATE WHOLE BODY: ICD-10-PCS | Mod: 26,PS,, | Performed by: RADIOLOGY

## 2021-10-08 PROCEDURE — 78815 PET IMAGE W/CT SKULL-THIGH: CPT | Mod: TC

## 2021-10-08 PROCEDURE — 99214 OFFICE O/P EST MOD 30 MIN: CPT | Mod: 25 | Performed by: SURGERY

## 2021-10-08 PROCEDURE — 78815 PET IMAGE W/CT SKULL-THIGH: CPT | Mod: 26,PS,, | Performed by: RADIOLOGY

## 2021-10-08 RX ORDER — KETOROLAC TROMETHAMINE 15 MG/ML
15 INJECTION, SOLUTION INTRAMUSCULAR; INTRAVENOUS
Status: CANCELLED | OUTPATIENT
Start: 2021-10-08 | End: 2021-10-11

## 2021-10-08 RX ORDER — LIDOCAINE HYDROCHLORIDE 10 MG/ML
1 INJECTION, SOLUTION EPIDURAL; INFILTRATION; INTRACAUDAL; PERINEURAL ONCE
Status: CANCELLED | OUTPATIENT
Start: 2021-10-08 | End: 2021-10-08

## 2021-10-08 RX ORDER — ONDANSETRON 2 MG/ML
8 INJECTION INTRAMUSCULAR; INTRAVENOUS
Status: CANCELLED | OUTPATIENT
Start: 2021-10-08

## 2021-10-08 RX ORDER — ACETAMINOPHEN 500 MG
1000 TABLET ORAL
Status: CANCELLED | OUTPATIENT
Start: 2021-10-08 | End: 2021-10-08

## 2021-10-08 RX ORDER — ENOXAPARIN SODIUM 100 MG/ML
30 INJECTION SUBCUTANEOUS
Status: CANCELLED | OUTPATIENT
Start: 2021-10-08

## 2021-10-08 RX ORDER — PREGABALIN 75 MG/1
75 CAPSULE ORAL
Status: CANCELLED | OUTPATIENT
Start: 2021-10-08

## 2021-10-08 RX ORDER — FAMOTIDINE 10 MG/ML
20 INJECTION INTRAVENOUS
Status: CANCELLED | OUTPATIENT
Start: 2021-10-08

## 2021-10-08 RX ORDER — DIPHENHYDRAMINE HCL 25 MG
TABLET ORAL
COMMUNITY
End: 2022-01-21

## 2021-10-08 RX ORDER — PRAZOSIN HYDROCHLORIDE 2 MG/1
2 CAPSULE ORAL 3 TIMES DAILY
Qty: 90 CAPSULE | Refills: 11 | Status: SHIPPED | OUTPATIENT
Start: 2021-10-08 | End: 2021-10-11

## 2021-10-08 RX ORDER — DICLOFENAC SODIUM 20 MG/G
SOLUTION TOPICAL
COMMUNITY
Start: 2021-09-17

## 2021-10-11 DIAGNOSIS — C74.92: ICD-10-CM

## 2021-10-11 DIAGNOSIS — D35.00 PHEOCHROMOCYTOMA, UNSPECIFIED LATERALITY: Primary | ICD-10-CM

## 2021-10-12 ENCOUNTER — LAB VISIT (OUTPATIENT)
Dept: LAB | Facility: HOSPITAL | Age: 56
End: 2021-10-12
Attending: SURGERY
Payer: COMMERCIAL

## 2021-10-12 DIAGNOSIS — D35.00 PHEOCHROMOCYTOMA, UNSPECIFIED LATERALITY: ICD-10-CM

## 2021-10-12 PROCEDURE — 83835 ASSAY OF METANEPHRINES: CPT | Performed by: SURGERY

## 2021-10-12 PROCEDURE — 86316 IMMUNOASSAY TUMOR OTHER: CPT | Performed by: SURGERY

## 2021-10-12 PROCEDURE — 82384 ASSAY THREE CATECHOLAMINES: CPT | Performed by: SURGERY

## 2021-10-12 RX ORDER — DILTIAZEM HYDROCHLORIDE 60 MG/1
60 CAPSULE, EXTENDED RELEASE ORAL 2 TIMES DAILY
Qty: 60 CAPSULE | Refills: 11 | Status: SHIPPED | OUTPATIENT
Start: 2021-10-12 | End: 2021-10-14

## 2021-10-13 ENCOUNTER — PATIENT MESSAGE (OUTPATIENT)
Dept: NEUROLOGY | Facility: HOSPITAL | Age: 56
End: 2021-10-13
Payer: COMMERCIAL

## 2021-10-14 ENCOUNTER — ANESTHESIA EVENT (OUTPATIENT)
Dept: SURGERY | Facility: HOSPITAL | Age: 56
DRG: 629 | End: 2021-10-14
Payer: COMMERCIAL

## 2021-10-14 DIAGNOSIS — D35.00 PHEOCHROMOCYTOMA, UNSPECIFIED LATERALITY: Primary | ICD-10-CM

## 2021-10-14 DIAGNOSIS — C74.92: ICD-10-CM

## 2021-10-15 ENCOUNTER — PATIENT MESSAGE (OUTPATIENT)
Dept: NEUROLOGY | Facility: HOSPITAL | Age: 56
End: 2021-10-15
Payer: COMMERCIAL

## 2021-10-15 RX ORDER — DILTIAZEM HYDROCHLORIDE 60 MG/1
60 TABLET, FILM COATED ORAL EVERY 12 HOURS
Qty: 60 TABLET | Refills: 11 | Status: SHIPPED | OUTPATIENT
Start: 2021-10-15 | End: 2022-01-21

## 2021-10-19 ENCOUNTER — HOSPITAL ENCOUNTER (OUTPATIENT)
Dept: RADIOLOGY | Facility: HOSPITAL | Age: 56
Discharge: HOME | End: 2021-10-19
Attending: SURGERY
Payer: COMMERCIAL

## 2021-10-19 DIAGNOSIS — D35.00 PHEOCHROMOCYTOMA, UNSPECIFIED LATERALITY: ICD-10-CM

## 2021-10-19 PROCEDURE — A9582 IODINE I-123 IOBENGUANE: HCPCS

## 2021-10-19 PROCEDURE — 78803 RP LOCLZJ TUM SPECT 1 AREA: CPT | Mod: TC

## 2021-10-19 PROCEDURE — 78803 RP LOCLZJ TUM SPECT 1 AREA: CPT | Mod: 26,,, | Performed by: RADIOLOGY

## 2021-10-19 PROCEDURE — 78803 NM TUMOR LOCALIZATION SPECT: ICD-10-PCS | Mod: 26,,, | Performed by: RADIOLOGY

## 2021-10-19 PROCEDURE — 78804 RP LOCLZJ TUM WHBDY 2+D IMG: CPT | Mod: 26,,, | Performed by: RADIOLOGY

## 2021-10-19 PROCEDURE — 78804 NM TUMOR LOCALIZATION MULTI MIBG: ICD-10-PCS | Mod: 26,,, | Performed by: RADIOLOGY

## 2021-10-20 ENCOUNTER — HOSPITAL ENCOUNTER (OUTPATIENT)
Dept: PREADMISSION TESTING | Facility: HOSPITAL | Age: 56
Discharge: HOME | End: 2021-10-20
Attending: SURGERY
Payer: COMMERCIAL

## 2021-10-20 ENCOUNTER — HOSPITAL ENCOUNTER (OUTPATIENT)
Dept: RADIOLOGY | Facility: HOSPITAL | Age: 56
Discharge: HOME | End: 2021-10-20
Attending: SURGERY
Payer: COMMERCIAL

## 2021-10-20 ENCOUNTER — OFFICE VISIT (OUTPATIENT)
Dept: NEUROLOGY | Facility: HOSPITAL | Age: 56
End: 2021-10-20
Attending: SURGERY
Payer: COMMERCIAL

## 2021-10-20 VITALS — DIASTOLIC BLOOD PRESSURE: 75 MMHG | SYSTOLIC BLOOD PRESSURE: 161 MMHG | HEART RATE: 68 BPM | OXYGEN SATURATION: 97 %

## 2021-10-20 VITALS
SYSTOLIC BLOOD PRESSURE: 124 MMHG | HEART RATE: 67 BPM | DIASTOLIC BLOOD PRESSURE: 82 MMHG | HEIGHT: 62 IN | WEIGHT: 225.31 LBS | TEMPERATURE: 98 F | BODY MASS INDEX: 41.46 KG/M2

## 2021-10-20 DIAGNOSIS — D35.02 PHEOCHROMOCYTOMA OF LEFT ADRENAL GLAND: ICD-10-CM

## 2021-10-20 LAB
METANEPH FREE SERPL-MCNC: <25 PG/ML
METANEPHS SERPL-MCNC: 317 PG/ML
NORMETANEPH FREE SERPL-MCNC: 317 PG/ML

## 2021-10-20 PROCEDURE — 99215 OFFICE O/P EST HI 40 MIN: CPT | Performed by: SURGERY

## 2021-10-20 RX ORDER — SCOLOPAMINE TRANSDERMAL SYSTEM 1 MG/1
1 PATCH, EXTENDED RELEASE TRANSDERMAL
Status: CANCELLED | OUTPATIENT
Start: 2021-10-20

## 2021-10-20 RX ORDER — SODIUM CHLORIDE, SODIUM LACTATE, POTASSIUM CHLORIDE, CALCIUM CHLORIDE 600; 310; 30; 20 MG/100ML; MG/100ML; MG/100ML; MG/100ML
INJECTION, SOLUTION INTRAVENOUS CONTINUOUS
Status: CANCELLED | OUTPATIENT
Start: 2021-10-20

## 2021-10-20 RX ORDER — LIDOCAINE HYDROCHLORIDE 10 MG/ML
1 INJECTION, SOLUTION EPIDURAL; INFILTRATION; INTRACAUDAL; PERINEURAL ONCE
Status: CANCELLED | OUTPATIENT
Start: 2021-10-20 | End: 2021-10-20

## 2021-10-21 ENCOUNTER — PATIENT MESSAGE (OUTPATIENT)
Dept: SURGERY | Facility: HOSPITAL | Age: 56
End: 2021-10-21
Payer: COMMERCIAL

## 2021-10-21 DIAGNOSIS — D35.02 PHEOCHROMOCYTOMA OF LEFT ADRENAL GLAND: Primary | ICD-10-CM

## 2021-10-21 DIAGNOSIS — D35.00 PHEOCHROMOCYTOMA, UNSPECIFIED LATERALITY: ICD-10-CM

## 2021-10-21 LAB
CATECHOLS PLAS-MCNC: 726 PG/ML
DOPAMINE SERPL-MCNC: <20 PG/ML
EPINEPH PLAS-MCNC: <40 PG/ML
NOREPINEPH PLAS-MCNC: 726 PG/ML

## 2021-10-21 RX ORDER — FLUCONAZOLE 150 MG/1
150 TABLET ORAL DAILY
Qty: 7 TABLET | Refills: 0 | Status: ON HOLD | OUTPATIENT
Start: 2021-10-21 | End: 2021-10-29 | Stop reason: HOSPADM

## 2021-10-25 ENCOUNTER — CLINICAL SUPPORT (OUTPATIENT)
Dept: SMOKING CESSATION | Facility: CLINIC | Age: 56
End: 2021-10-25

## 2021-10-25 ENCOUNTER — ANESTHESIA (OUTPATIENT)
Dept: SURGERY | Facility: HOSPITAL | Age: 56
DRG: 629 | End: 2021-10-25
Payer: COMMERCIAL

## 2021-10-25 ENCOUNTER — HOSPITAL ENCOUNTER (INPATIENT)
Facility: HOSPITAL | Age: 56
LOS: 4 days | Discharge: HOME | DRG: 629 | End: 2021-10-29
Attending: SURGERY | Admitting: SURGERY
Payer: COMMERCIAL

## 2021-10-25 DIAGNOSIS — F17.210 CIGARETTE SMOKER: Primary | ICD-10-CM

## 2021-10-25 DIAGNOSIS — D35.00 PHEOCHROMOCYTOMA, UNSPECIFIED LATERALITY: ICD-10-CM

## 2021-10-25 DIAGNOSIS — D35.02 PHEOCHROMOCYTOMA OF LEFT ADRENAL GLAND: ICD-10-CM

## 2021-10-25 DIAGNOSIS — C7B.8 METASTATIC MALIGNANT NEUROENDOCRINE TUMOR TO LYMPH NODE: Primary | ICD-10-CM

## 2021-10-25 LAB
ABO + RH BLD: NORMAL
ANION GAP SERPL CALC-SCNC: 10 MMOL/L (ref 8–16)
BASOPHILS # BLD AUTO: 0.05 K/UL (ref 0–0.2)
BASOPHILS NFR BLD: 0.2 % (ref 0–1.9)
BLD GP AB SCN CELLS X3 SERPL QL: NORMAL
BUN SERPL-MCNC: 10 MG/DL (ref 6–20)
CALCIUM SERPL-MCNC: 7.5 MG/DL (ref 8.7–10.5)
CHLORIDE SERPL-SCNC: 108 MMOL/L (ref 95–110)
CO2 SERPL-SCNC: 22 MMOL/L (ref 23–29)
CREAT SERPL-MCNC: 0.6 MG/DL (ref 0.5–1.4)
DIFFERENTIAL METHOD: ABNORMAL
EOSINOPHIL # BLD AUTO: 0 K/UL (ref 0–0.5)
EOSINOPHIL NFR BLD: 0 % (ref 0–8)
ERYTHROCYTE [DISTWIDTH] IN BLOOD BY AUTOMATED COUNT: 12.9 % (ref 11.5–14.5)
EST. GFR  (AFRICAN AMERICAN): >60 ML/MIN/1.73 M^2
EST. GFR  (NON AFRICAN AMERICAN): >60 ML/MIN/1.73 M^2
GLUCOSE SERPL-MCNC: 161 MG/DL (ref 70–110)
HCT VFR BLD AUTO: 37 % (ref 37–48.5)
HGB BLD-MCNC: 12.7 G/DL (ref 12–16)
IMM GRANULOCYTES # BLD AUTO: 0.15 K/UL (ref 0–0.04)
IMM GRANULOCYTES NFR BLD AUTO: 0.7 % (ref 0–0.5)
LYMPHOCYTES # BLD AUTO: 1.1 K/UL (ref 1–4.8)
LYMPHOCYTES NFR BLD: 5 % (ref 18–48)
MCH RBC QN AUTO: 31.6 PG (ref 27–31)
MCHC RBC AUTO-ENTMCNC: 34.3 G/DL (ref 32–36)
MCV RBC AUTO: 92 FL (ref 82–98)
MONOCYTES # BLD AUTO: 1.4 K/UL (ref 0.3–1)
MONOCYTES NFR BLD: 6.4 % (ref 4–15)
NEUTROPHILS # BLD AUTO: 19.6 K/UL (ref 1.8–7.7)
NEUTROPHILS NFR BLD: 87.7 % (ref 38–73)
NRBC BLD-RTO: 0 /100 WBC
PLATELET # BLD AUTO: 288 K/UL (ref 150–450)
PMV BLD AUTO: 10.6 FL (ref 9.2–12.9)
POTASSIUM SERPL-SCNC: 3.9 MMOL/L (ref 3.5–5.1)
RBC # BLD AUTO: 4.02 M/UL (ref 4–5.4)
SODIUM SERPL-SCNC: 140 MMOL/L (ref 136–145)
WBC # BLD AUTO: 22.35 K/UL (ref 3.9–12.7)

## 2021-10-25 PROCEDURE — 36000707: Performed by: SURGERY

## 2021-10-25 PROCEDURE — 25000003 PHARM REV CODE 250: Performed by: STUDENT IN AN ORGANIZED HEALTH CARE EDUCATION/TRAINING PROGRAM

## 2021-10-25 PROCEDURE — S0030 INJECTION, METRONIDAZOLE: HCPCS | Performed by: SURGERY

## 2021-10-25 PROCEDURE — 88342 CHG IMMUNOCYTOCHEMISTRY: ICD-10-PCS | Mod: 26,,, | Performed by: PATHOLOGY

## 2021-10-25 PROCEDURE — 88331 PATH CONSLTJ SURG 1 BLK 1SPC: CPT | Mod: 26,,, | Performed by: PATHOLOGY

## 2021-10-25 PROCEDURE — 88305 TISSUE EXAM BY PATHOLOGIST: CPT | Mod: 26,,, | Performed by: PATHOLOGY

## 2021-10-25 PROCEDURE — 88331 PR  PATH CONSULT IN SURG,W FRZ SEC: ICD-10-PCS | Mod: 26,,, | Performed by: PATHOLOGY

## 2021-10-25 PROCEDURE — 37000009 HC ANESTHESIA EA ADD 15 MINS: Performed by: SURGERY

## 2021-10-25 PROCEDURE — 63600175 PHARM REV CODE 636 W HCPCS: Performed by: STUDENT IN AN ORGANIZED HEALTH CARE EDUCATION/TRAINING PROGRAM

## 2021-10-25 PROCEDURE — 88341 PR IHC OR ICC EACH ADD'L SINGLE ANTIBODY  STAINPR: ICD-10-PCS | Mod: 26,,, | Performed by: PATHOLOGY

## 2021-10-25 PROCEDURE — 25000003 PHARM REV CODE 250: Performed by: NURSE PRACTITIONER

## 2021-10-25 PROCEDURE — 86900 BLOOD TYPING SEROLOGIC ABO: CPT | Performed by: SURGERY

## 2021-10-25 PROCEDURE — 88313 SPECIAL STAINS GROUP 2: CPT | Performed by: PATHOLOGY

## 2021-10-25 PROCEDURE — 63600175 PHARM REV CODE 636 W HCPCS: Mod: JG | Performed by: STUDENT IN AN ORGANIZED HEALTH CARE EDUCATION/TRAINING PROGRAM

## 2021-10-25 PROCEDURE — 88307 PR  SURG PATH,LEVEL V: ICD-10-PCS | Mod: 26,,, | Performed by: PATHOLOGY

## 2021-10-25 PROCEDURE — 85025 COMPLETE CBC W/AUTO DIFF WBC: CPT | Performed by: SURGERY

## 2021-10-25 PROCEDURE — 37000008 HC ANESTHESIA 1ST 15 MINUTES: Performed by: SURGERY

## 2021-10-25 PROCEDURE — 88313 PR  SPECIAL STAINS,GROUP II: ICD-10-PCS | Mod: 26,,, | Performed by: PATHOLOGY

## 2021-10-25 PROCEDURE — 20000000 HC ICU ROOM

## 2021-10-25 PROCEDURE — 25000003 PHARM REV CODE 250: Performed by: SURGERY

## 2021-10-25 PROCEDURE — 36000706: Performed by: SURGERY

## 2021-10-25 PROCEDURE — 99406 PT REFUSED TOBACCO CESSATION: ICD-10-PCS | Mod: S$GLB,,,

## 2021-10-25 PROCEDURE — 88331 PATH CONSLTJ SURG 1 BLK 1SPC: CPT | Mod: 59 | Performed by: PATHOLOGY

## 2021-10-25 PROCEDURE — 88307 TISSUE EXAM BY PATHOLOGIST: CPT | Mod: 26,,, | Performed by: PATHOLOGY

## 2021-10-25 PROCEDURE — 36415 COLL VENOUS BLD VENIPUNCTURE: CPT | Performed by: SURGERY

## 2021-10-25 PROCEDURE — 27201423 OPTIME MED/SURG SUP & DEVICES STERILE SUPPLY: Performed by: SURGERY

## 2021-10-25 PROCEDURE — 63600175 PHARM REV CODE 636 W HCPCS: Performed by: SURGERY

## 2021-10-25 PROCEDURE — 88341 IMHCHEM/IMCYTCHM EA ADD ANTB: CPT | Mod: 26,,, | Performed by: PATHOLOGY

## 2021-10-25 PROCEDURE — 88341 IMHCHEM/IMCYTCHM EA ADD ANTB: CPT | Mod: 59 | Performed by: PATHOLOGY

## 2021-10-25 PROCEDURE — C9290 INJ, BUPIVACAINE LIPOSOME: HCPCS | Performed by: STUDENT IN AN ORGANIZED HEALTH CARE EDUCATION/TRAINING PROGRAM

## 2021-10-25 PROCEDURE — 88305 TISSUE EXAM BY PATHOLOGIST: CPT | Mod: 59 | Performed by: PATHOLOGY

## 2021-10-25 PROCEDURE — 88342 IMHCHEM/IMCYTCHM 1ST ANTB: CPT | Mod: 26,,, | Performed by: PATHOLOGY

## 2021-10-25 PROCEDURE — 88307 TISSUE EXAM BY PATHOLOGIST: CPT | Performed by: PATHOLOGY

## 2021-10-25 PROCEDURE — 99406 BEHAV CHNG SMOKING 3-10 MIN: CPT | Mod: S$GLB,,,

## 2021-10-25 PROCEDURE — 88313 SPECIAL STAINS GROUP 2: CPT | Mod: 26,,, | Performed by: PATHOLOGY

## 2021-10-25 PROCEDURE — 88305 TISSUE EXAM BY PATHOLOGIST: ICD-10-PCS | Mod: 26,,, | Performed by: PATHOLOGY

## 2021-10-25 PROCEDURE — 80048 BASIC METABOLIC PNL TOTAL CA: CPT | Performed by: SURGERY

## 2021-10-25 PROCEDURE — 88342 IMHCHEM/IMCYTCHM 1ST ANTB: CPT | Mod: 59 | Performed by: PATHOLOGY

## 2021-10-25 PROCEDURE — 27000221 HC OXYGEN, UP TO 24 HOURS

## 2021-10-25 PROCEDURE — 94760 N-INVAS EAR/PLS OXIMETRY 1: CPT

## 2021-10-25 RX ORDER — NICARDIPINE HYDROCHLORIDE 0.2 MG/ML
0-15 INJECTION INTRAVENOUS CONTINUOUS
Status: DISCONTINUED | OUTPATIENT
Start: 2021-10-25 | End: 2021-10-25

## 2021-10-25 RX ORDER — HYDRALAZINE HYDROCHLORIDE 20 MG/ML
5 INJECTION INTRAMUSCULAR; INTRAVENOUS
Status: DISCONTINUED | OUTPATIENT
Start: 2021-10-25 | End: 2021-10-29 | Stop reason: HOSPADM

## 2021-10-25 RX ORDER — LIDOCAINE HYDROCHLORIDE 10 MG/ML
1 INJECTION, SOLUTION EPIDURAL; INFILTRATION; INTRACAUDAL; PERINEURAL ONCE
Status: DISCONTINUED | OUTPATIENT
Start: 2021-10-25 | End: 2021-10-25 | Stop reason: HOSPADM

## 2021-10-25 RX ORDER — DIPHENHYDRAMINE HYDROCHLORIDE 50 MG/ML
12.5 INJECTION INTRAMUSCULAR; INTRAVENOUS EVERY 4 HOURS PRN
Status: DISCONTINUED | OUTPATIENT
Start: 2021-10-25 | End: 2021-10-29 | Stop reason: HOSPADM

## 2021-10-25 RX ORDER — FENTANYL CITRATE 50 UG/ML
INJECTION, SOLUTION INTRAMUSCULAR; INTRAVENOUS
Status: DISCONTINUED | OUTPATIENT
Start: 2021-10-25 | End: 2021-10-25

## 2021-10-25 RX ORDER — ACETAMINOPHEN 500 MG
1000 TABLET ORAL
Status: COMPLETED | OUTPATIENT
Start: 2021-10-25 | End: 2021-10-25

## 2021-10-25 RX ORDER — PREGABALIN 75 MG/1
75 CAPSULE ORAL 2 TIMES DAILY
Status: DISCONTINUED | OUTPATIENT
Start: 2021-10-25 | End: 2021-10-29 | Stop reason: HOSPADM

## 2021-10-25 RX ORDER — HYDROMORPHONE HYDROCHLORIDE 1 MG/ML
0.2 INJECTION, SOLUTION INTRAMUSCULAR; INTRAVENOUS; SUBCUTANEOUS ONCE
Status: DISCONTINUED | OUTPATIENT
Start: 2021-10-25 | End: 2021-10-26

## 2021-10-25 RX ORDER — SODIUM CHLORIDE 0.9 % (FLUSH) 0.9 %
3 SYRINGE (ML) INJECTION
Status: DISCONTINUED | OUTPATIENT
Start: 2021-10-25 | End: 2021-10-25

## 2021-10-25 RX ORDER — BUPIVACAINE HYDROCHLORIDE 2.5 MG/ML
INJECTION, SOLUTION EPIDURAL; INFILTRATION; INTRACAUDAL
Status: DISCONTINUED | OUTPATIENT
Start: 2021-10-25 | End: 2021-10-25 | Stop reason: HOSPADM

## 2021-10-25 RX ORDER — FAMOTIDINE 10 MG/ML
20 INJECTION INTRAVENOUS
Status: COMPLETED | OUTPATIENT
Start: 2021-10-25 | End: 2021-10-25

## 2021-10-25 RX ORDER — ONDANSETRON 2 MG/ML
8 INJECTION INTRAMUSCULAR; INTRAVENOUS
Status: COMPLETED | OUTPATIENT
Start: 2021-10-25 | End: 2021-10-25

## 2021-10-25 RX ORDER — METRONIDAZOLE 500 MG/100ML
500 INJECTION, SOLUTION INTRAVENOUS
Status: COMPLETED | OUTPATIENT
Start: 2021-10-25 | End: 2021-10-25

## 2021-10-25 RX ORDER — ACETAMINOPHEN 500 MG
1000 TABLET ORAL EVERY 8 HOURS
Status: COMPLETED | OUTPATIENT
Start: 2021-10-26 | End: 2021-10-27

## 2021-10-25 RX ORDER — SODIUM NITROPRUSSIDE 25 MG/ML
INJECTION INTRAVENOUS
Status: DISCONTINUED | OUTPATIENT
Start: 2021-10-25 | End: 2021-10-25

## 2021-10-25 RX ORDER — PREGABALIN 75 MG/1
75 CAPSULE ORAL
Status: DISCONTINUED | OUTPATIENT
Start: 2021-10-25 | End: 2021-10-25 | Stop reason: HOSPADM

## 2021-10-25 RX ORDER — KETOROLAC TROMETHAMINE 30 MG/ML
15 INJECTION, SOLUTION INTRAMUSCULAR; INTRAVENOUS
Status: DISCONTINUED | OUTPATIENT
Start: 2021-10-25 | End: 2021-10-25 | Stop reason: HOSPADM

## 2021-10-25 RX ORDER — HYDROMORPHONE HCL IN 0.9% NACL 6 MG/30 ML
PATIENT CONTROLLED ANALGESIA SYRINGE INTRAVENOUS CONTINUOUS
Status: DISCONTINUED | OUTPATIENT
Start: 2021-10-25 | End: 2021-10-28

## 2021-10-25 RX ORDER — LIDOCAINE HYDROCHLORIDE 20 MG/ML
INJECTION INTRAVENOUS
Status: DISCONTINUED | OUTPATIENT
Start: 2021-10-25 | End: 2021-10-25

## 2021-10-25 RX ORDER — ROCURONIUM BROMIDE 10 MG/ML
INJECTION, SOLUTION INTRAVENOUS
Status: DISCONTINUED | OUTPATIENT
Start: 2021-10-25 | End: 2021-10-25

## 2021-10-25 RX ORDER — ONDANSETRON 2 MG/ML
4 INJECTION INTRAMUSCULAR; INTRAVENOUS EVERY 6 HOURS PRN
Status: DISCONTINUED | OUTPATIENT
Start: 2021-10-25 | End: 2021-10-29 | Stop reason: HOSPADM

## 2021-10-25 RX ORDER — SCOLOPAMINE TRANSDERMAL SYSTEM 1 MG/1
1 PATCH, EXTENDED RELEASE TRANSDERMAL
Status: DISCONTINUED | OUTPATIENT
Start: 2021-10-25 | End: 2021-10-25 | Stop reason: HOSPADM

## 2021-10-25 RX ORDER — ENOXAPARIN SODIUM 100 MG/ML
30 INJECTION SUBCUTANEOUS
Status: COMPLETED | OUTPATIENT
Start: 2021-10-25 | End: 2021-10-25

## 2021-10-25 RX ORDER — SODIUM CHLORIDE, SODIUM LACTATE, POTASSIUM CHLORIDE, CALCIUM CHLORIDE 600; 310; 30; 20 MG/100ML; MG/100ML; MG/100ML; MG/100ML
INJECTION, SOLUTION INTRAVENOUS CONTINUOUS PRN
Status: DISCONTINUED | OUTPATIENT
Start: 2021-10-25 | End: 2021-10-25

## 2021-10-25 RX ORDER — LABETALOL HYDROCHLORIDE 5 MG/ML
5 INJECTION, SOLUTION INTRAVENOUS
Status: DISCONTINUED | OUTPATIENT
Start: 2021-10-25 | End: 2021-10-29 | Stop reason: HOSPADM

## 2021-10-25 RX ORDER — VASOPRESSIN 20 [USP'U]/ML
INJECTION, SOLUTION INTRAMUSCULAR; SUBCUTANEOUS
Status: DISCONTINUED | OUTPATIENT
Start: 2021-10-25 | End: 2021-10-25

## 2021-10-25 RX ORDER — NALOXONE HCL 0.4 MG/ML
0.02 VIAL (ML) INJECTION
Status: DISCONTINUED | OUTPATIENT
Start: 2021-10-25 | End: 2021-10-29 | Stop reason: HOSPADM

## 2021-10-25 RX ORDER — SODIUM CHLORIDE, SODIUM LACTATE, POTASSIUM CHLORIDE, CALCIUM CHLORIDE 600; 310; 30; 20 MG/100ML; MG/100ML; MG/100ML; MG/100ML
INJECTION, SOLUTION INTRAVENOUS CONTINUOUS
Status: DISCONTINUED | OUTPATIENT
Start: 2021-10-25 | End: 2021-10-26

## 2021-10-25 RX ORDER — DEXTROSE MONOHYDRATE, SODIUM CHLORIDE, AND POTASSIUM CHLORIDE 50; 1.49; 4.5 G/1000ML; G/1000ML; G/1000ML
INJECTION, SOLUTION INTRAVENOUS CONTINUOUS
Status: DISCONTINUED | OUTPATIENT
Start: 2021-10-25 | End: 2021-10-29

## 2021-10-25 RX ORDER — SODIUM CHLORIDE 0.9 % (FLUSH) 0.9 %
.1-1 SYRINGE (ML) INJECTION
Status: DISCONTINUED | OUTPATIENT
Start: 2021-10-25 | End: 2021-10-25

## 2021-10-25 RX ORDER — MUPIROCIN 20 MG/G
OINTMENT TOPICAL 2 TIMES DAILY
Status: DISCONTINUED | OUTPATIENT
Start: 2021-10-25 | End: 2021-10-29 | Stop reason: HOSPADM

## 2021-10-25 RX ORDER — PROPOFOL 10 MG/ML
VIAL (ML) INTRAVENOUS
Status: DISCONTINUED | OUTPATIENT
Start: 2021-10-25 | End: 2021-10-25

## 2021-10-25 RX ORDER — KETAMINE HCL IN 0.9 % NACL 50 MG/5 ML
SYRINGE (ML) INTRAVENOUS
Status: DISCONTINUED | OUTPATIENT
Start: 2021-10-25 | End: 2021-10-25

## 2021-10-25 RX ORDER — VANCOMYCIN HYDROCHLORIDE 1 G/20ML
INJECTION, POWDER, LYOPHILIZED, FOR SOLUTION INTRAVENOUS
Status: DISCONTINUED | OUTPATIENT
Start: 2021-10-25 | End: 2021-10-25 | Stop reason: HOSPADM

## 2021-10-25 RX ORDER — HYDROMORPHONE HYDROCHLORIDE 2 MG/ML
INJECTION, SOLUTION INTRAMUSCULAR; INTRAVENOUS; SUBCUTANEOUS
Status: DISCONTINUED | OUTPATIENT
Start: 2021-10-25 | End: 2021-10-25

## 2021-10-25 RX ORDER — KETOROLAC TROMETHAMINE 30 MG/ML
10 INJECTION, SOLUTION INTRAMUSCULAR; INTRAVENOUS EVERY 6 HOURS
Status: COMPLETED | OUTPATIENT
Start: 2021-10-25 | End: 2021-10-27

## 2021-10-25 RX ORDER — ALBUTEROL SULFATE 2.5 MG/.5ML
2.5 SOLUTION RESPIRATORY (INHALATION) EVERY 4 HOURS PRN
Status: DISCONTINUED | OUTPATIENT
Start: 2021-10-25 | End: 2021-10-29 | Stop reason: HOSPADM

## 2021-10-25 RX ORDER — MIDAZOLAM HYDROCHLORIDE 1 MG/ML
INJECTION INTRAMUSCULAR; INTRAVENOUS
Status: DISCONTINUED | OUTPATIENT
Start: 2021-10-25 | End: 2021-10-25

## 2021-10-25 RX ORDER — BUPIVACAINE HYDROCHLORIDE 5 MG/ML
INJECTION, SOLUTION EPIDURAL; INTRACAUDAL
Status: DISCONTINUED | OUTPATIENT
Start: 2021-10-25 | End: 2021-10-25

## 2021-10-25 RX ORDER — SODIUM CHLORIDE 9 MG/ML
INJECTION, SOLUTION INTRAVENOUS
Status: DISCONTINUED | OUTPATIENT
Start: 2021-10-25 | End: 2021-10-29 | Stop reason: HOSPADM

## 2021-10-25 RX ORDER — PHENYLEPHRINE HYDROCHLORIDE 10 MG/ML
INJECTION INTRAVENOUS
Status: DISCONTINUED | OUTPATIENT
Start: 2021-10-25 | End: 2021-10-25

## 2021-10-25 RX ORDER — MAGNESIUM SULFATE HEPTAHYDRATE 40 MG/ML
2 INJECTION, SOLUTION INTRAVENOUS ONCE
Status: COMPLETED | OUTPATIENT
Start: 2021-10-25 | End: 2021-10-25

## 2021-10-25 RX ORDER — CIPROFLOXACIN 2 MG/ML
400 INJECTION, SOLUTION INTRAVENOUS ONCE
Status: COMPLETED | OUTPATIENT
Start: 2021-10-25 | End: 2021-10-25

## 2021-10-25 RX ORDER — VECURONIUM BROMIDE FOR INJECTION 1 MG/ML
INJECTION, POWDER, LYOPHILIZED, FOR SOLUTION INTRAVENOUS
Status: DISCONTINUED | OUTPATIENT
Start: 2021-10-25 | End: 2021-10-25

## 2021-10-25 RX ORDER — ACETAMINOPHEN 10 MG/ML
1000 INJECTION, SOLUTION INTRAVENOUS EVERY 8 HOURS
Status: COMPLETED | OUTPATIENT
Start: 2021-10-25 | End: 2021-10-26

## 2021-10-25 RX ORDER — PHENTOLAMINE MESYLATE 5 MG/1
20 INJECTION INTRAMUSCULAR; INTRAVENOUS ONCE
Status: COMPLETED | OUTPATIENT
Start: 2021-10-25 | End: 2021-10-25

## 2021-10-25 RX ORDER — ONDANSETRON 2 MG/ML
INJECTION INTRAMUSCULAR; INTRAVENOUS
Status: DISCONTINUED | OUTPATIENT
Start: 2021-10-25 | End: 2021-10-25

## 2021-10-25 RX ADMIN — PHENYLEPHRINE HYDROCHLORIDE 200 MCG: 10 INJECTION INTRAVENOUS at 08:10

## 2021-10-25 RX ADMIN — SODIUM NITROPRUSSIDE 200 MCG: 25 INJECTION, SOLUTION, CONCENTRATE INTRAVENOUS at 10:10

## 2021-10-25 RX ADMIN — BUPIVACAINE HYDROCHLORIDE 20 ML: 5 INJECTION, SOLUTION EPIDURAL; INTRACAUDAL at 06:10

## 2021-10-25 RX ADMIN — FENTANYL CITRATE 100 MCG: 50 INJECTION, SOLUTION INTRAMUSCULAR; INTRAVENOUS at 09:10

## 2021-10-25 RX ADMIN — SODIUM NITROPRUSSIDE 100 MCG: 25 INJECTION, SOLUTION, CONCENTRATE INTRAVENOUS at 10:10

## 2021-10-25 RX ADMIN — IRON SUCROSE 100 MG: 20 INJECTION, SOLUTION INTRAVENOUS at 03:10

## 2021-10-25 RX ADMIN — HYDROCORTISONE SODIUM SUCCINATE 50 MG: 100 INJECTION, POWDER, FOR SOLUTION INTRAMUSCULAR; INTRAVENOUS at 06:10

## 2021-10-25 RX ADMIN — FENTANYL CITRATE 50 MCG: 50 INJECTION, SOLUTION INTRAMUSCULAR; INTRAVENOUS at 08:10

## 2021-10-25 RX ADMIN — ENOXAPARIN SODIUM 30 MG: 30 INJECTION, SOLUTION INTRAVENOUS; SUBCUTANEOUS at 06:10

## 2021-10-25 RX ADMIN — SODIUM NITROPRUSSIDE 100 MCG: 25 INJECTION, SOLUTION, CONCENTRATE INTRAVENOUS at 09:10

## 2021-10-25 RX ADMIN — PREGABALIN 75 MG: 75 CAPSULE ORAL at 06:10

## 2021-10-25 RX ADMIN — KETOROLAC TROMETHAMINE 10 MG: 30 INJECTION, SOLUTION INTRAMUSCULAR at 06:10

## 2021-10-25 RX ADMIN — METRONIDAZOLE 500 MG: 500 INJECTION, SOLUTION INTRAVENOUS at 03:10

## 2021-10-25 RX ADMIN — HYDROMORPHONE HYDROCHLORIDE 1 MG: 2 INJECTION INTRAMUSCULAR; INTRAVENOUS; SUBCUTANEOUS at 11:10

## 2021-10-25 RX ADMIN — LIDOCAINE HYDROCHLORIDE 100 MG: 20 INJECTION, SOLUTION INTRAVENOUS at 07:10

## 2021-10-25 RX ADMIN — PHENYLEPHRINE HYDROCHLORIDE 100 MCG: 10 INJECTION INTRAVENOUS at 09:10

## 2021-10-25 RX ADMIN — ONDANSETRON 4 MG: 2 INJECTION, SOLUTION INTRAMUSCULAR; INTRAVENOUS at 01:10

## 2021-10-25 RX ADMIN — ESMOLOL HYDROCHLORIDE 20 MG: 10 INJECTION INTRAVENOUS at 11:10

## 2021-10-25 RX ADMIN — PHENYLEPHRINE HYDROCHLORIDE 100 MCG: 10 INJECTION INTRAVENOUS at 08:10

## 2021-10-25 RX ADMIN — MAGNESIUM SULFATE IN WATER 2 G: 40 INJECTION, SOLUTION INTRAVENOUS at 04:10

## 2021-10-25 RX ADMIN — FAMOTIDINE 20 MG: 10 INJECTION, SOLUTION INTRAVENOUS at 06:10

## 2021-10-25 RX ADMIN — VASOPRESSIN 3 UNITS: 20 INJECTION, SOLUTION INTRAMUSCULAR; SUBCUTANEOUS at 12:10

## 2021-10-25 RX ADMIN — BUPIVACAINE HYDROCHLORIDE 20 ML: 5 INJECTION, SOLUTION EPIDURAL; INTRACAUDAL at 07:10

## 2021-10-25 RX ADMIN — SUGAMMADEX 200 MG: 100 INJECTION, SOLUTION INTRAVENOUS at 02:10

## 2021-10-25 RX ADMIN — SODIUM NITROPRUSSIDE 100 MCG: 25 INJECTION, SOLUTION, CONCENTRATE INTRAVENOUS at 12:10

## 2021-10-25 RX ADMIN — SODIUM NITROPRUSSIDE 0.5 MCG/KG/MIN: 50 INJECTION INTRAVENOUS at 09:10

## 2021-10-25 RX ADMIN — PHENTOLAMINE MESYLATE 4 MG: 5 INJECTION, POWDER, FOR SOLUTION INTRAMUSCULAR; INTRAVENOUS at 10:10

## 2021-10-25 RX ADMIN — SODIUM CHLORIDE, SODIUM LACTATE, POTASSIUM CHLORIDE, AND CALCIUM CHLORIDE: .6; .31; .03; .02 INJECTION, SOLUTION INTRAVENOUS at 11:10

## 2021-10-25 RX ADMIN — VANCOMYCIN HYDROCHLORIDE 2 G: 1 INJECTION, POWDER, LYOPHILIZED, FOR SOLUTION INTRAVENOUS at 07:10

## 2021-10-25 RX ADMIN — VASOPRESSIN 6 UNITS: 20 INJECTION, SOLUTION INTRAMUSCULAR; SUBCUTANEOUS at 12:10

## 2021-10-25 RX ADMIN — CALCIUM GLUCONATE 1 G: 98 INJECTION, SOLUTION INTRAVENOUS at 04:10

## 2021-10-25 RX ADMIN — Medication 15 MG: at 08:10

## 2021-10-25 RX ADMIN — SODIUM CHLORIDE, SODIUM LACTATE, POTASSIUM CHLORIDE, AND CALCIUM CHLORIDE: .6; .31; .03; .02 INJECTION, SOLUTION INTRAVENOUS at 07:10

## 2021-10-25 RX ADMIN — CIPROFLOXACIN 400 MG: 2 INJECTION, SOLUTION INTRAVENOUS at 03:10

## 2021-10-25 RX ADMIN — PHENTOLAMINE MESYLATE 1 MG: 5 INJECTION, POWDER, FOR SOLUTION INTRAMUSCULAR; INTRAVENOUS at 10:10

## 2021-10-25 RX ADMIN — DEXTROSE, SODIUM CHLORIDE, AND POTASSIUM CHLORIDE: 5; .45; .15 INJECTION INTRAVENOUS at 03:10

## 2021-10-25 RX ADMIN — FENTANYL CITRATE 100 MCG: 50 INJECTION, SOLUTION INTRAMUSCULAR; INTRAVENOUS at 08:10

## 2021-10-25 RX ADMIN — HYDROMORPHONE HYDROCHLORIDE 1 MG: 2 INJECTION INTRAMUSCULAR; INTRAVENOUS; SUBCUTANEOUS at 12:10

## 2021-10-25 RX ADMIN — SCOPALAMINE 1 PATCH: 1 PATCH, EXTENDED RELEASE TRANSDERMAL at 06:10

## 2021-10-25 RX ADMIN — ACETAMINOPHEN 1000 MG: 10 INJECTION INTRAVENOUS at 09:10

## 2021-10-25 RX ADMIN — VECURONIUM BROMIDE 2 MG: 10 INJECTION, POWDER, LYOPHILIZED, FOR SOLUTION INTRAVENOUS at 09:10

## 2021-10-25 RX ADMIN — VASOPRESSIN 3 UNITS: 20 INJECTION, SOLUTION INTRAMUSCULAR; SUBCUTANEOUS at 11:10

## 2021-10-25 RX ADMIN — PROPOFOL 200 MG: 10 INJECTION, EMULSION INTRAVENOUS at 07:10

## 2021-10-25 RX ADMIN — MIDAZOLAM HYDROCHLORIDE 5 MG: 1 INJECTION, SOLUTION INTRAMUSCULAR; INTRAVENOUS at 06:10

## 2021-10-25 RX ADMIN — KETOROLAC TROMETHAMINE 10 MG: 30 INJECTION, SOLUTION INTRAMUSCULAR at 11:10

## 2021-10-25 RX ADMIN — AMPICILLIN SODIUM AND SULBACTAM SODIUM 3 G: 2; 1 INJECTION, POWDER, FOR SOLUTION INTRAMUSCULAR; INTRAVENOUS at 11:10

## 2021-10-25 RX ADMIN — KETAMINE HYDROCHLORIDE 9 MG/HR: 50 INJECTION, SOLUTION, CONCENTRATE INTRAMUSCULAR; INTRAVENOUS at 08:10

## 2021-10-25 RX ADMIN — ONDANSETRON 8 MG: 2 INJECTION INTRAMUSCULAR; INTRAVENOUS at 06:10

## 2021-10-25 RX ADMIN — ROCURONIUM BROMIDE 100 MG: 10 INJECTION, SOLUTION INTRAVENOUS at 07:10

## 2021-10-25 RX ADMIN — ACETAMINOPHEN 1000 MG: 10 INJECTION INTRAVENOUS at 03:10

## 2021-10-25 RX ADMIN — AMPICILLIN SODIUM AND SULBACTAM SODIUM 3 G: 2; 1 INJECTION, POWDER, FOR SOLUTION INTRAMUSCULAR; INTRAVENOUS at 07:10

## 2021-10-25 RX ADMIN — HYDROMORPHONE HYDROCHLORIDE: 2 INJECTION, SOLUTION INTRAMUSCULAR; INTRAVENOUS; SUBCUTANEOUS at 03:10

## 2021-10-25 RX ADMIN — PHENYLEPHRINE HYDROCHLORIDE 200 MCG: 10 INJECTION INTRAVENOUS at 09:10

## 2021-10-25 RX ADMIN — VECURONIUM BROMIDE 2 MG: 10 INJECTION, POWDER, LYOPHILIZED, FOR SOLUTION INTRAVENOUS at 10:10

## 2021-10-25 RX ADMIN — VASOPRESSIN 3 UNITS: 20 INJECTION, SOLUTION INTRAMUSCULAR; SUBCUTANEOUS at 10:10

## 2021-10-25 RX ADMIN — ESMOLOL HYDROCHLORIDE 40 MG: 10 INJECTION INTRAVENOUS at 07:10

## 2021-10-25 RX ADMIN — BUPIVACAINE 20 ML: 13.3 INJECTION, SUSPENSION, LIPOSOMAL INFILTRATION at 07:10

## 2021-10-25 RX ADMIN — ACETAMINOPHEN 1000 MG: 500 TABLET ORAL at 06:10

## 2021-10-25 RX ADMIN — KETOROLAC TROMETHAMINE 15 MG: 30 INJECTION, SOLUTION INTRAMUSCULAR; INTRAVENOUS at 06:10

## 2021-10-25 RX ADMIN — HYDROMORPHONE HYDROCHLORIDE 1 MG: 2 INJECTION INTRAMUSCULAR; INTRAVENOUS; SUBCUTANEOUS at 10:10

## 2021-10-25 RX ADMIN — VASOPRESSIN 4 UNITS: 20 INJECTION, SOLUTION INTRAMUSCULAR; SUBCUTANEOUS at 12:10

## 2021-10-25 RX ADMIN — METRONIDAZOLE 500 MG: 500 INJECTION, SOLUTION INTRAVENOUS at 09:10

## 2021-10-25 RX ADMIN — VASOPRESSIN 2 UNITS: 20 INJECTION, SOLUTION INTRAMUSCULAR; SUBCUTANEOUS at 10:10

## 2021-10-25 RX ADMIN — CALCIUM GLUCONATE 1 G: 98 INJECTION, SOLUTION INTRAVENOUS at 03:10

## 2021-10-25 RX ADMIN — MUPIROCIN: 20 OINTMENT TOPICAL at 09:10

## 2021-10-25 RX ADMIN — PHENYLEPHRINE HYDROCHLORIDE 100 MCG: 10 INJECTION INTRAVENOUS at 07:10

## 2021-10-25 RX ADMIN — HYDROMORPHONE HYDROCHLORIDE 1 MG: 2 INJECTION INTRAMUSCULAR; INTRAVENOUS; SUBCUTANEOUS at 01:10

## 2021-10-25 RX ADMIN — PREGABALIN 75 MG: 75 CAPSULE ORAL at 09:10

## 2021-10-26 ENCOUNTER — PATIENT MESSAGE (OUTPATIENT)
Dept: NEUROLOGY | Facility: HOSPITAL | Age: 56
End: 2021-10-26
Payer: COMMERCIAL

## 2021-10-26 LAB
ALBUMIN SERPL BCP-MCNC: 2.5 G/DL (ref 3.5–5.2)
ALP SERPL-CCNC: 52 U/L (ref 55–135)
ALT SERPL W/O P-5'-P-CCNC: 38 U/L (ref 10–44)
ANION GAP SERPL CALC-SCNC: 7 MMOL/L (ref 8–16)
AST SERPL-CCNC: 38 U/L (ref 10–40)
BASOPHILS # BLD AUTO: 0.03 K/UL (ref 0–0.2)
BASOPHILS NFR BLD: 0.3 % (ref 0–1.9)
BILIRUB SERPL-MCNC: 0.6 MG/DL (ref 0.1–1)
BUN SERPL-MCNC: 11 MG/DL (ref 6–20)
CALCIUM SERPL-MCNC: 7.6 MG/DL (ref 8.7–10.5)
CHLORIDE SERPL-SCNC: 105 MMOL/L (ref 95–110)
CO2 SERPL-SCNC: 24 MMOL/L (ref 23–29)
CREAT SERPL-MCNC: 0.6 MG/DL (ref 0.5–1.4)
DIFFERENTIAL METHOD: ABNORMAL
EOSINOPHIL # BLD AUTO: 0.1 K/UL (ref 0–0.5)
EOSINOPHIL NFR BLD: 1.1 % (ref 0–8)
ERYTHROCYTE [DISTWIDTH] IN BLOOD BY AUTOMATED COUNT: 13.2 % (ref 11.5–14.5)
EST. GFR  (AFRICAN AMERICAN): >60 ML/MIN/1.73 M^2
EST. GFR  (NON AFRICAN AMERICAN): >60 ML/MIN/1.73 M^2
GLUCOSE SERPL-MCNC: 134 MG/DL (ref 70–110)
HCT VFR BLD AUTO: 32.1 % (ref 37–48.5)
HGB BLD-MCNC: 11 G/DL (ref 12–16)
IMM GRANULOCYTES # BLD AUTO: 0.05 K/UL (ref 0–0.04)
IMM GRANULOCYTES NFR BLD AUTO: 0.5 % (ref 0–0.5)
LYMPHOCYTES # BLD AUTO: 1.3 K/UL (ref 1–4.8)
LYMPHOCYTES NFR BLD: 12.4 % (ref 18–48)
MAGNESIUM SERPL-MCNC: 2.2 MG/DL (ref 1.6–2.6)
MCH RBC QN AUTO: 31.8 PG (ref 27–31)
MCHC RBC AUTO-ENTMCNC: 34.3 G/DL (ref 32–36)
MCV RBC AUTO: 93 FL (ref 82–98)
MONOCYTES # BLD AUTO: 0.9 K/UL (ref 0.3–1)
MONOCYTES NFR BLD: 8.2 % (ref 4–15)
NEUTROPHILS # BLD AUTO: 8 K/UL (ref 1.8–7.7)
NEUTROPHILS NFR BLD: 77.5 % (ref 38–73)
NRBC BLD-RTO: 0 /100 WBC
PHOSPHATE SERPL-MCNC: 2.2 MG/DL (ref 2.7–4.5)
PLATELET # BLD AUTO: 235 K/UL (ref 150–450)
PMV BLD AUTO: 10.9 FL (ref 9.2–12.9)
POTASSIUM SERPL-SCNC: 3.7 MMOL/L (ref 3.5–5.1)
PROT SERPL-MCNC: 4.8 G/DL (ref 6–8.4)
RBC # BLD AUTO: 3.46 M/UL (ref 4–5.4)
SODIUM SERPL-SCNC: 136 MMOL/L (ref 136–145)
WBC # BLD AUTO: 10.36 K/UL (ref 3.9–12.7)

## 2021-10-26 PROCEDURE — 11000001 HC ACUTE MED/SURG PRIVATE ROOM

## 2021-10-26 PROCEDURE — 97165 OT EVAL LOW COMPLEX 30 MIN: CPT

## 2021-10-26 PROCEDURE — 80053 COMPREHEN METABOLIC PANEL: CPT | Performed by: SURGERY

## 2021-10-26 PROCEDURE — 25000003 PHARM REV CODE 250: Performed by: SURGERY

## 2021-10-26 PROCEDURE — 85025 COMPLETE CBC W/AUTO DIFF WBC: CPT | Performed by: SURGERY

## 2021-10-26 PROCEDURE — 63600175 PHARM REV CODE 636 W HCPCS: Performed by: SURGERY

## 2021-10-26 PROCEDURE — 83735 ASSAY OF MAGNESIUM: CPT | Performed by: SURGERY

## 2021-10-26 PROCEDURE — 94799 UNLISTED PULMONARY SVC/PX: CPT

## 2021-10-26 PROCEDURE — 27000221 HC OXYGEN, UP TO 24 HOURS

## 2021-10-26 PROCEDURE — 25000003 PHARM REV CODE 250: Performed by: FAMILY MEDICINE

## 2021-10-26 PROCEDURE — 84100 ASSAY OF PHOSPHORUS: CPT | Performed by: SURGERY

## 2021-10-26 PROCEDURE — 97162 PT EVAL MOD COMPLEX 30 MIN: CPT | Performed by: PHYSICAL THERAPIST

## 2021-10-26 PROCEDURE — 94761 N-INVAS EAR/PLS OXIMETRY MLT: CPT

## 2021-10-26 PROCEDURE — 99900035 HC TECH TIME PER 15 MIN (STAT)

## 2021-10-26 PROCEDURE — 97530 THERAPEUTIC ACTIVITIES: CPT | Performed by: PHYSICAL THERAPIST

## 2021-10-26 PROCEDURE — 63600175 PHARM REV CODE 636 W HCPCS: Performed by: FAMILY MEDICINE

## 2021-10-26 RX ORDER — POTASSIUM CHLORIDE 14.9 MG/ML
20 INJECTION INTRAVENOUS
Status: COMPLETED | OUTPATIENT
Start: 2021-10-26 | End: 2021-10-26

## 2021-10-26 RX ORDER — LEVOTHYROXINE SODIUM 50 UG/1
50 TABLET ORAL
Status: DISCONTINUED | OUTPATIENT
Start: 2021-10-27 | End: 2021-10-26

## 2021-10-26 RX ORDER — LEVOTHYROXINE SODIUM 50 UG/1
50 TABLET ORAL
Status: DISCONTINUED | OUTPATIENT
Start: 2021-10-26 | End: 2021-10-29 | Stop reason: HOSPADM

## 2021-10-26 RX ORDER — METHOCARBAMOL 100 MG/ML
1000 INJECTION, SOLUTION INTRAMUSCULAR; INTRAVENOUS EVERY 8 HOURS
Status: DISCONTINUED | OUTPATIENT
Start: 2021-10-26 | End: 2021-10-29 | Stop reason: HOSPADM

## 2021-10-26 RX ADMIN — METHOCARBAMOL 1000 MG: 100 INJECTION INTRAMUSCULAR; INTRAVENOUS at 04:10

## 2021-10-26 RX ADMIN — ACETAMINOPHEN 1000 MG: 500 TABLET ORAL at 09:10

## 2021-10-26 RX ADMIN — ACETAMINOPHEN 1000 MG: 500 TABLET ORAL at 03:10

## 2021-10-26 RX ADMIN — MUPIROCIN: 20 OINTMENT TOPICAL at 08:10

## 2021-10-26 RX ADMIN — KETOROLAC TROMETHAMINE 10 MG: 30 INJECTION, SOLUTION INTRAMUSCULAR at 11:10

## 2021-10-26 RX ADMIN — IRON SUCROSE 100 MG: 20 INJECTION, SOLUTION INTRAVENOUS at 09:10

## 2021-10-26 RX ADMIN — ACETAMINOPHEN 1000 MG: 10 INJECTION INTRAVENOUS at 06:10

## 2021-10-26 RX ADMIN — DEXTROSE, SODIUM CHLORIDE, AND POTASSIUM CHLORIDE: 5; .45; .15 INJECTION INTRAVENOUS at 09:10

## 2021-10-26 RX ADMIN — POTASSIUM CHLORIDE 20 MEQ: 14.9 INJECTION, SOLUTION INTRAVENOUS at 12:10

## 2021-10-26 RX ADMIN — DEXTROSE, SODIUM CHLORIDE, AND POTASSIUM CHLORIDE: 5; .45; .15 INJECTION INTRAVENOUS at 02:10

## 2021-10-26 RX ADMIN — DEXTROSE, SODIUM CHLORIDE, AND POTASSIUM CHLORIDE: 5; .45; .15 INJECTION INTRAVENOUS at 12:10

## 2021-10-26 RX ADMIN — POTASSIUM CHLORIDE 20 MEQ: 14.9 INJECTION, SOLUTION INTRAVENOUS at 10:10

## 2021-10-26 RX ADMIN — LEVOTHYROXINE SODIUM 50 MCG: 50 TABLET ORAL at 08:10

## 2021-10-26 RX ADMIN — METHOCARBAMOL 1000 MG: 100 INJECTION INTRAMUSCULAR; INTRAVENOUS at 09:10

## 2021-10-26 RX ADMIN — KETOROLAC TROMETHAMINE 10 MG: 30 INJECTION, SOLUTION INTRAMUSCULAR at 06:10

## 2021-10-26 RX ADMIN — PREGABALIN 75 MG: 75 CAPSULE ORAL at 08:10

## 2021-10-26 RX ADMIN — HYDROMORPHONE HYDROCHLORIDE: 2 INJECTION, SOLUTION INTRAMUSCULAR; INTRAVENOUS; SUBCUTANEOUS at 09:10

## 2021-10-27 LAB
ALBUMIN SERPL BCP-MCNC: 2.4 G/DL (ref 3.5–5.2)
ALP SERPL-CCNC: 54 U/L (ref 55–135)
ALT SERPL W/O P-5'-P-CCNC: 31 U/L (ref 10–44)
ANION GAP SERPL CALC-SCNC: 5 MMOL/L (ref 8–16)
AST SERPL-CCNC: 30 U/L (ref 10–40)
BASOPHILS # BLD AUTO: 0.02 K/UL (ref 0–0.2)
BASOPHILS NFR BLD: 0.2 % (ref 0–1.9)
BILIRUB SERPL-MCNC: 0.5 MG/DL (ref 0.1–1)
BUN SERPL-MCNC: 6 MG/DL (ref 6–20)
CALCIUM SERPL-MCNC: 7.5 MG/DL (ref 8.7–10.5)
CHLORIDE SERPL-SCNC: 103 MMOL/L (ref 95–110)
CO2 SERPL-SCNC: 25 MMOL/L (ref 23–29)
CREAT SERPL-MCNC: 0.6 MG/DL (ref 0.5–1.4)
DIFFERENTIAL METHOD: ABNORMAL
EOSINOPHIL # BLD AUTO: 0.2 K/UL (ref 0–0.5)
EOSINOPHIL NFR BLD: 1.3 % (ref 0–8)
ERYTHROCYTE [DISTWIDTH] IN BLOOD BY AUTOMATED COUNT: 13.1 % (ref 11.5–14.5)
EST. GFR  (AFRICAN AMERICAN): >60 ML/MIN/1.73 M^2
EST. GFR  (NON AFRICAN AMERICAN): >60 ML/MIN/1.73 M^2
GLUCOSE SERPL-MCNC: 119 MG/DL (ref 70–110)
HCT VFR BLD AUTO: 30.2 % (ref 37–48.5)
HGB BLD-MCNC: 10 G/DL (ref 12–16)
IMM GRANULOCYTES # BLD AUTO: 0.05 K/UL (ref 0–0.04)
IMM GRANULOCYTES NFR BLD AUTO: 0.4 % (ref 0–0.5)
LYMPHOCYTES # BLD AUTO: 1.3 K/UL (ref 1–4.8)
LYMPHOCYTES NFR BLD: 11 % (ref 18–48)
MAGNESIUM SERPL-MCNC: 2.1 MG/DL (ref 1.6–2.6)
MCH RBC QN AUTO: 31.3 PG (ref 27–31)
MCHC RBC AUTO-ENTMCNC: 33.1 G/DL (ref 32–36)
MCV RBC AUTO: 95 FL (ref 82–98)
MONOCYTES # BLD AUTO: 0.9 K/UL (ref 0.3–1)
MONOCYTES NFR BLD: 7.7 % (ref 4–15)
NEUTROPHILS # BLD AUTO: 9.6 K/UL (ref 1.8–7.7)
NEUTROPHILS NFR BLD: 79.4 % (ref 38–73)
NRBC BLD-RTO: 0 /100 WBC
PHOSPHATE SERPL-MCNC: 1.8 MG/DL (ref 2.7–4.5)
PLATELET # BLD AUTO: 216 K/UL (ref 150–450)
PMV BLD AUTO: 10.6 FL (ref 9.2–12.9)
POTASSIUM SERPL-SCNC: 4 MMOL/L (ref 3.5–5.1)
PROT SERPL-MCNC: 5.1 G/DL (ref 6–8.4)
RBC # BLD AUTO: 3.19 M/UL (ref 4–5.4)
SODIUM SERPL-SCNC: 133 MMOL/L (ref 136–145)
WBC # BLD AUTO: 12.13 K/UL (ref 3.9–12.7)

## 2021-10-27 PROCEDURE — 27000221 HC OXYGEN, UP TO 24 HOURS

## 2021-10-27 PROCEDURE — 85025 COMPLETE CBC W/AUTO DIFF WBC: CPT | Performed by: SURGERY

## 2021-10-27 PROCEDURE — 97116 GAIT TRAINING THERAPY: CPT

## 2021-10-27 PROCEDURE — 97535 SELF CARE MNGMENT TRAINING: CPT

## 2021-10-27 PROCEDURE — 80053 COMPREHEN METABOLIC PANEL: CPT | Performed by: SURGERY

## 2021-10-27 PROCEDURE — 84100 ASSAY OF PHOSPHORUS: CPT | Performed by: SURGERY

## 2021-10-27 PROCEDURE — 94760 N-INVAS EAR/PLS OXIMETRY 1: CPT

## 2021-10-27 PROCEDURE — 25000003 PHARM REV CODE 250: Performed by: FAMILY MEDICINE

## 2021-10-27 PROCEDURE — 63600175 PHARM REV CODE 636 W HCPCS: Performed by: FAMILY MEDICINE

## 2021-10-27 PROCEDURE — 83735 ASSAY OF MAGNESIUM: CPT | Performed by: SURGERY

## 2021-10-27 PROCEDURE — 99900035 HC TECH TIME PER 15 MIN (STAT)

## 2021-10-27 PROCEDURE — 63600175 PHARM REV CODE 636 W HCPCS: Performed by: SURGERY

## 2021-10-27 PROCEDURE — 11000001 HC ACUTE MED/SURG PRIVATE ROOM

## 2021-10-27 PROCEDURE — 25000003 PHARM REV CODE 250: Performed by: SURGERY

## 2021-10-27 PROCEDURE — 97530 THERAPEUTIC ACTIVITIES: CPT

## 2021-10-27 PROCEDURE — 94761 N-INVAS EAR/PLS OXIMETRY MLT: CPT

## 2021-10-27 PROCEDURE — 94799 UNLISTED PULMONARY SVC/PX: CPT

## 2021-10-27 RX ORDER — CALCIUM CARBONATE 200(500)MG
500 TABLET,CHEWABLE ORAL 3 TIMES DAILY PRN
Status: DISCONTINUED | OUTPATIENT
Start: 2021-10-27 | End: 2021-10-29 | Stop reason: HOSPADM

## 2021-10-27 RX ORDER — PANTOPRAZOLE SODIUM 40 MG/1
40 TABLET, DELAYED RELEASE ORAL DAILY
Status: DISCONTINUED | OUTPATIENT
Start: 2021-10-28 | End: 2021-10-29 | Stop reason: HOSPADM

## 2021-10-27 RX ORDER — TRAMADOL HYDROCHLORIDE 50 MG/1
100 TABLET ORAL EVERY 6 HOURS PRN
Status: DISCONTINUED | OUTPATIENT
Start: 2021-10-27 | End: 2021-10-29 | Stop reason: HOSPADM

## 2021-10-27 RX ORDER — KETOROLAC TROMETHAMINE 30 MG/ML
15 INJECTION, SOLUTION INTRAMUSCULAR; INTRAVENOUS EVERY 6 HOURS PRN
Status: DISCONTINUED | OUTPATIENT
Start: 2021-10-27 | End: 2021-10-29 | Stop reason: HOSPADM

## 2021-10-27 RX ADMIN — KETOROLAC TROMETHAMINE 10 MG: 30 INJECTION, SOLUTION INTRAMUSCULAR at 01:10

## 2021-10-27 RX ADMIN — IRON SUCROSE 100 MG: 20 INJECTION, SOLUTION INTRAVENOUS at 10:10

## 2021-10-27 RX ADMIN — ONDANSETRON 4 MG: 2 INJECTION INTRAMUSCULAR; INTRAVENOUS at 10:10

## 2021-10-27 RX ADMIN — KETOROLAC TROMETHAMINE 15 MG: 30 INJECTION, SOLUTION INTRAMUSCULAR at 08:10

## 2021-10-27 RX ADMIN — PREGABALIN 75 MG: 75 CAPSULE ORAL at 10:10

## 2021-10-27 RX ADMIN — CALCIUM CARBONATE (ANTACID) CHEW TAB 500 MG 500 MG: 500 CHEW TAB at 08:10

## 2021-10-27 RX ADMIN — METHOCARBAMOL 1000 MG: 100 INJECTION INTRAMUSCULAR; INTRAVENOUS at 09:10

## 2021-10-27 RX ADMIN — METHOCARBAMOL 1000 MG: 100 INJECTION INTRAMUSCULAR; INTRAVENOUS at 05:10

## 2021-10-27 RX ADMIN — DEXTROSE, SODIUM CHLORIDE, AND POTASSIUM CHLORIDE: 5; .45; .15 INJECTION INTRAVENOUS at 10:10

## 2021-10-27 RX ADMIN — MUPIROCIN: 20 OINTMENT TOPICAL at 10:10

## 2021-10-27 RX ADMIN — METHOCARBAMOL 1000 MG: 100 INJECTION INTRAMUSCULAR; INTRAVENOUS at 03:10

## 2021-10-27 RX ADMIN — KETOROLAC TROMETHAMINE 10 MG: 30 INJECTION, SOLUTION INTRAMUSCULAR at 05:10

## 2021-10-27 RX ADMIN — TRAMADOL HYDROCHLORIDE 100 MG: 50 TABLET, FILM COATED ORAL at 07:10

## 2021-10-27 RX ADMIN — ACETAMINOPHEN 1000 MG: 500 TABLET ORAL at 05:10

## 2021-10-27 RX ADMIN — PREGABALIN 75 MG: 75 CAPSULE ORAL at 08:10

## 2021-10-27 RX ADMIN — LEVOTHYROXINE SODIUM 50 MCG: 50 TABLET ORAL at 05:10

## 2021-10-27 RX ADMIN — MUPIROCIN: 20 OINTMENT TOPICAL at 09:10

## 2021-10-27 RX ADMIN — KETOROLAC TROMETHAMINE 10 MG: 30 INJECTION, SOLUTION INTRAMUSCULAR at 12:10

## 2021-10-28 ENCOUNTER — CLINICAL SUPPORT (OUTPATIENT)
Dept: SMOKING CESSATION | Facility: CLINIC | Age: 56
End: 2021-10-28

## 2021-10-28 DIAGNOSIS — F17.210 CIGARETTE SMOKER: Primary | ICD-10-CM

## 2021-10-28 LAB
ALBUMIN SERPL BCP-MCNC: 2.4 G/DL (ref 3.5–5.2)
ALP SERPL-CCNC: 61 U/L (ref 55–135)
ALT SERPL W/O P-5'-P-CCNC: 27 U/L (ref 10–44)
ANION GAP SERPL CALC-SCNC: 6 MMOL/L (ref 8–16)
AST SERPL-CCNC: 21 U/L (ref 10–40)
BASOPHILS # BLD AUTO: 0.02 K/UL (ref 0–0.2)
BASOPHILS NFR BLD: 0.2 % (ref 0–1.9)
BILIRUB SERPL-MCNC: 0.4 MG/DL (ref 0.1–1)
BUN SERPL-MCNC: 4 MG/DL (ref 6–20)
CALCIUM SERPL-MCNC: 8.1 MG/DL (ref 8.7–10.5)
CHLORIDE SERPL-SCNC: 104 MMOL/L (ref 95–110)
CO2 SERPL-SCNC: 25 MMOL/L (ref 23–29)
CREAT SERPL-MCNC: 0.5 MG/DL (ref 0.5–1.4)
DIFFERENTIAL METHOD: ABNORMAL
EOSINOPHIL # BLD AUTO: 0.2 K/UL (ref 0–0.5)
EOSINOPHIL NFR BLD: 1.9 % (ref 0–8)
ERYTHROCYTE [DISTWIDTH] IN BLOOD BY AUTOMATED COUNT: 12.9 % (ref 11.5–14.5)
EST. GFR  (AFRICAN AMERICAN): >60 ML/MIN/1.73 M^2
EST. GFR  (NON AFRICAN AMERICAN): >60 ML/MIN/1.73 M^2
GLUCOSE SERPL-MCNC: 105 MG/DL (ref 70–110)
HCT VFR BLD AUTO: 30.1 % (ref 37–48.5)
HGB BLD-MCNC: 10 G/DL (ref 12–16)
IMM GRANULOCYTES # BLD AUTO: 0.1 K/UL (ref 0–0.04)
IMM GRANULOCYTES NFR BLD AUTO: 1 % (ref 0–0.5)
LYMPHOCYTES # BLD AUTO: 1.2 K/UL (ref 1–4.8)
LYMPHOCYTES NFR BLD: 11.9 % (ref 18–48)
MAGNESIUM SERPL-MCNC: 2 MG/DL (ref 1.6–2.6)
MCH RBC QN AUTO: 31.6 PG (ref 27–31)
MCHC RBC AUTO-ENTMCNC: 33.2 G/DL (ref 32–36)
MCV RBC AUTO: 95 FL (ref 82–98)
MONOCYTES # BLD AUTO: 0.9 K/UL (ref 0.3–1)
MONOCYTES NFR BLD: 9 % (ref 4–15)
NEUTROPHILS # BLD AUTO: 7.9 K/UL (ref 1.8–7.7)
NEUTROPHILS NFR BLD: 76 % (ref 38–73)
NRBC BLD-RTO: 0 /100 WBC
PHOSPHATE SERPL-MCNC: 1.6 MG/DL (ref 2.7–4.5)
PLATELET # BLD AUTO: 228 K/UL (ref 150–450)
PMV BLD AUTO: 10.4 FL (ref 9.2–12.9)
POTASSIUM SERPL-SCNC: 3.9 MMOL/L (ref 3.5–5.1)
PROT SERPL-MCNC: 5.4 G/DL (ref 6–8.4)
RBC # BLD AUTO: 3.16 M/UL (ref 4–5.4)
SODIUM SERPL-SCNC: 135 MMOL/L (ref 136–145)
WBC # BLD AUTO: 10.39 K/UL (ref 3.9–12.7)

## 2021-10-28 PROCEDURE — 25000003 PHARM REV CODE 250: Performed by: FAMILY MEDICINE

## 2021-10-28 PROCEDURE — 94760 N-INVAS EAR/PLS OXIMETRY 1: CPT

## 2021-10-28 PROCEDURE — 99900035 HC TECH TIME PER 15 MIN (STAT)

## 2021-10-28 PROCEDURE — 63600175 PHARM REV CODE 636 W HCPCS: Performed by: ANESTHESIOLOGY

## 2021-10-28 PROCEDURE — 99406 BEHAV CHNG SMOKING 3-10 MIN: CPT | Mod: S$GLB,,,

## 2021-10-28 PROCEDURE — 27000221 HC OXYGEN, UP TO 24 HOURS

## 2021-10-28 PROCEDURE — 99406 PT REFUSED TOBACCO CESSATION: ICD-10-PCS | Mod: S$GLB,,,

## 2021-10-28 PROCEDURE — 11000001 HC ACUTE MED/SURG PRIVATE ROOM

## 2021-10-28 PROCEDURE — 25000003 PHARM REV CODE 250: Performed by: ANESTHESIOLOGY

## 2021-10-28 PROCEDURE — 94799 UNLISTED PULMONARY SVC/PX: CPT

## 2021-10-28 PROCEDURE — 84100 ASSAY OF PHOSPHORUS: CPT | Performed by: SURGERY

## 2021-10-28 PROCEDURE — 97535 SELF CARE MNGMENT TRAINING: CPT | Mod: CO

## 2021-10-28 PROCEDURE — 63600175 PHARM REV CODE 636 W HCPCS: Performed by: SURGERY

## 2021-10-28 PROCEDURE — 25000003 PHARM REV CODE 250: Performed by: SURGERY

## 2021-10-28 PROCEDURE — 83735 ASSAY OF MAGNESIUM: CPT | Performed by: SURGERY

## 2021-10-28 PROCEDURE — 85025 COMPLETE CBC W/AUTO DIFF WBC: CPT | Performed by: SURGERY

## 2021-10-28 PROCEDURE — C9290 INJ, BUPIVACAINE LIPOSOME: HCPCS | Performed by: ANESTHESIOLOGY

## 2021-10-28 PROCEDURE — 25000003 PHARM REV CODE 250: Performed by: STUDENT IN AN ORGANIZED HEALTH CARE EDUCATION/TRAINING PROGRAM

## 2021-10-28 PROCEDURE — 63600175 PHARM REV CODE 636 W HCPCS: Performed by: FAMILY MEDICINE

## 2021-10-28 PROCEDURE — 80053 COMPREHEN METABOLIC PANEL: CPT | Performed by: SURGERY

## 2021-10-28 PROCEDURE — 97110 THERAPEUTIC EXERCISES: CPT | Mod: CO

## 2021-10-28 PROCEDURE — 94761 N-INVAS EAR/PLS OXIMETRY MLT: CPT

## 2021-10-28 RX ORDER — OXYCODONE AND ACETAMINOPHEN 5; 325 MG/1; MG/1
1 TABLET ORAL EVERY 8 HOURS PRN
Status: DISCONTINUED | OUTPATIENT
Start: 2021-10-28 | End: 2021-10-29 | Stop reason: HOSPADM

## 2021-10-28 RX ADMIN — OXYCODONE HYDROCHLORIDE AND ACETAMINOPHEN 1 TABLET: 5; 325 TABLET ORAL at 08:10

## 2021-10-28 RX ADMIN — MUPIROCIN: 20 OINTMENT TOPICAL at 08:10

## 2021-10-28 RX ADMIN — KETOROLAC TROMETHAMINE 15 MG: 30 INJECTION, SOLUTION INTRAMUSCULAR at 04:10

## 2021-10-28 RX ADMIN — LEVOTHYROXINE SODIUM 50 MCG: 50 TABLET ORAL at 05:10

## 2021-10-28 RX ADMIN — KETOROLAC TROMETHAMINE 15 MG: 30 INJECTION, SOLUTION INTRAMUSCULAR at 05:10

## 2021-10-28 RX ADMIN — DEXTROSE, SODIUM CHLORIDE, AND POTASSIUM CHLORIDE: 5; .45; .15 INJECTION INTRAVENOUS at 05:10

## 2021-10-28 RX ADMIN — METHOCARBAMOL 1000 MG: 100 INJECTION INTRAMUSCULAR; INTRAVENOUS at 12:10

## 2021-10-28 RX ADMIN — PANTOPRAZOLE SODIUM 40 MG: 40 TABLET, DELAYED RELEASE ORAL at 11:10

## 2021-10-28 RX ADMIN — CALCIUM CARBONATE (ANTACID) CHEW TAB 500 MG 500 MG: 500 CHEW TAB at 05:10

## 2021-10-28 RX ADMIN — METHOCARBAMOL 1000 MG: 100 INJECTION INTRAMUSCULAR; INTRAVENOUS at 05:10

## 2021-10-28 RX ADMIN — PREGABALIN 75 MG: 75 CAPSULE ORAL at 07:10

## 2021-10-28 RX ADMIN — PREGABALIN 75 MG: 75 CAPSULE ORAL at 08:10

## 2021-10-28 RX ADMIN — CALCIUM CARBONATE (ANTACID) CHEW TAB 500 MG 500 MG: 500 CHEW TAB at 12:10

## 2021-10-28 RX ADMIN — METHOCARBAMOL 1000 MG: 100 INJECTION INTRAMUSCULAR; INTRAVENOUS at 10:10

## 2021-10-28 RX ADMIN — DEXTROSE, SODIUM CHLORIDE, AND POTASSIUM CHLORIDE: 5; .45; .15 INJECTION INTRAVENOUS at 08:10

## 2021-10-28 RX ADMIN — BUPIVACAINE HYDROCHLORIDE 10 ML: 5 INJECTION, SOLUTION EPIDURAL; INTRACAUDAL at 02:10

## 2021-10-28 RX ADMIN — TRAMADOL HYDROCHLORIDE 100 MG: 50 TABLET, FILM COATED ORAL at 12:10

## 2021-10-28 RX ADMIN — BUPIVACAINE 10 ML: 13.3 INJECTION, SUSPENSION, LIPOSOMAL INFILTRATION at 02:10

## 2021-10-28 RX ADMIN — MUPIROCIN: 20 OINTMENT TOPICAL at 07:10

## 2021-10-28 RX ADMIN — LIDOCAINE HYDROCHLORIDE: 20 SOLUTION ORAL; TOPICAL at 02:10

## 2021-10-28 RX ADMIN — IRON SUCROSE 100 MG: 20 INJECTION, SOLUTION INTRAVENOUS at 11:10

## 2021-10-29 ENCOUNTER — ANESTHESIA EVENT (OUTPATIENT)
Dept: MEDSURG UNIT | Facility: HOSPITAL | Age: 56
DRG: 629 | End: 2021-10-29
Payer: COMMERCIAL

## 2021-10-29 ENCOUNTER — ANESTHESIA (OUTPATIENT)
Dept: MEDSURG UNIT | Facility: HOSPITAL | Age: 56
DRG: 629 | End: 2021-10-29
Payer: COMMERCIAL

## 2021-10-29 VITALS
WEIGHT: 248.25 LBS | HEART RATE: 83 BPM | BODY MASS INDEX: 45.68 KG/M2 | DIASTOLIC BLOOD PRESSURE: 67 MMHG | TEMPERATURE: 98 F | OXYGEN SATURATION: 95 % | RESPIRATION RATE: 18 BRPM | SYSTOLIC BLOOD PRESSURE: 151 MMHG | HEIGHT: 62 IN

## 2021-10-29 PROBLEM — D35.00 PHEOCHROMOCYTOMA: Status: RESOLVED | Noted: 2021-10-25 | Resolved: 2021-10-29

## 2021-10-29 LAB
ALBUMIN SERPL BCP-MCNC: 2.3 G/DL (ref 3.5–5.2)
ALP SERPL-CCNC: 56 U/L (ref 55–135)
ALT SERPL W/O P-5'-P-CCNC: 31 U/L (ref 10–44)
ANION GAP SERPL CALC-SCNC: 7 MMOL/L (ref 8–16)
AST SERPL-CCNC: 27 U/L (ref 10–40)
BASOPHILS # BLD AUTO: 0.02 K/UL (ref 0–0.2)
BASOPHILS NFR BLD: 0.2 % (ref 0–1.9)
BILIRUB SERPL-MCNC: 0.3 MG/DL (ref 0.1–1)
BUN SERPL-MCNC: 5 MG/DL (ref 6–20)
CALCIUM SERPL-MCNC: 8.1 MG/DL (ref 8.7–10.5)
CHLORIDE SERPL-SCNC: 105 MMOL/L (ref 95–110)
CO2 SERPL-SCNC: 25 MMOL/L (ref 23–29)
CREAT SERPL-MCNC: 0.6 MG/DL (ref 0.5–1.4)
DIFFERENTIAL METHOD: ABNORMAL
EOSINOPHIL # BLD AUTO: 0.2 K/UL (ref 0–0.5)
EOSINOPHIL NFR BLD: 2.9 % (ref 0–8)
ERYTHROCYTE [DISTWIDTH] IN BLOOD BY AUTOMATED COUNT: 13.2 % (ref 11.5–14.5)
EST. GFR  (AFRICAN AMERICAN): >60 ML/MIN/1.73 M^2
EST. GFR  (NON AFRICAN AMERICAN): >60 ML/MIN/1.73 M^2
GLUCOSE SERPL-MCNC: 116 MG/DL (ref 70–110)
HCT VFR BLD AUTO: 29.3 % (ref 37–48.5)
HGB BLD-MCNC: 9.9 G/DL (ref 12–16)
IMM GRANULOCYTES # BLD AUTO: 0.07 K/UL (ref 0–0.04)
IMM GRANULOCYTES NFR BLD AUTO: 0.9 % (ref 0–0.5)
LYMPHOCYTES # BLD AUTO: 1.6 K/UL (ref 1–4.8)
LYMPHOCYTES NFR BLD: 19.4 % (ref 18–48)
MAGNESIUM SERPL-MCNC: 1.7 MG/DL (ref 1.6–2.6)
MCH RBC QN AUTO: 31.6 PG (ref 27–31)
MCHC RBC AUTO-ENTMCNC: 33.8 G/DL (ref 32–36)
MCV RBC AUTO: 94 FL (ref 82–98)
MONOCYTES # BLD AUTO: 0.9 K/UL (ref 0.3–1)
MONOCYTES NFR BLD: 11.3 % (ref 4–15)
NEUTROPHILS # BLD AUTO: 5.4 K/UL (ref 1.8–7.7)
NEUTROPHILS NFR BLD: 65.3 % (ref 38–73)
NRBC BLD-RTO: 0 /100 WBC
PHOSPHATE SERPL-MCNC: 2.3 MG/DL (ref 2.7–4.5)
PLATELET # BLD AUTO: 254 K/UL (ref 150–450)
PMV BLD AUTO: 10 FL (ref 9.2–12.9)
POTASSIUM SERPL-SCNC: 3.6 MMOL/L (ref 3.5–5.1)
PROT SERPL-MCNC: 5.4 G/DL (ref 6–8.4)
RBC # BLD AUTO: 3.13 M/UL (ref 4–5.4)
SODIUM SERPL-SCNC: 137 MMOL/L (ref 136–145)
WBC # BLD AUTO: 8.21 K/UL (ref 3.9–12.7)

## 2021-10-29 PROCEDURE — 97535 SELF CARE MNGMENT TRAINING: CPT | Mod: CO

## 2021-10-29 PROCEDURE — 25000003 PHARM REV CODE 250: Performed by: SURGERY

## 2021-10-29 PROCEDURE — 80053 COMPREHEN METABOLIC PANEL: CPT | Performed by: SURGERY

## 2021-10-29 PROCEDURE — 97116 GAIT TRAINING THERAPY: CPT | Mod: CQ

## 2021-10-29 PROCEDURE — 84100 ASSAY OF PHOSPHORUS: CPT | Performed by: SURGERY

## 2021-10-29 PROCEDURE — 83735 ASSAY OF MAGNESIUM: CPT | Performed by: SURGERY

## 2021-10-29 PROCEDURE — 94761 N-INVAS EAR/PLS OXIMETRY MLT: CPT

## 2021-10-29 PROCEDURE — 63600175 PHARM REV CODE 636 W HCPCS: Performed by: SURGERY

## 2021-10-29 PROCEDURE — 94799 UNLISTED PULMONARY SVC/PX: CPT

## 2021-10-29 PROCEDURE — 25000003 PHARM REV CODE 250: Performed by: STUDENT IN AN ORGANIZED HEALTH CARE EDUCATION/TRAINING PROGRAM

## 2021-10-29 PROCEDURE — 97530 THERAPEUTIC ACTIVITIES: CPT | Mod: CO

## 2021-10-29 PROCEDURE — 85025 COMPLETE CBC W/AUTO DIFF WBC: CPT | Performed by: SURGERY

## 2021-10-29 PROCEDURE — 76942 ECHO GUIDE FOR BIOPSY: CPT | Performed by: ANESTHESIOLOGY

## 2021-10-29 PROCEDURE — 25000003 PHARM REV CODE 250: Performed by: FAMILY MEDICINE

## 2021-10-29 RX ORDER — OXYCODONE AND ACETAMINOPHEN 5; 325 MG/1; MG/1
1 TABLET ORAL EVERY 4 HOURS PRN
Qty: 10 TABLET | Refills: 0 | Status: SHIPPED | OUTPATIENT
Start: 2021-10-29 | End: 2022-01-21

## 2021-10-29 RX ORDER — BUPIVACAINE HYDROCHLORIDE 5 MG/ML
INJECTION, SOLUTION EPIDURAL; INTRACAUDAL
Status: DISCONTINUED | OUTPATIENT
Start: 2021-10-25 | End: 2021-10-29

## 2021-10-29 RX ORDER — SODIUM,POTASSIUM PHOSPHATES 280-250MG
1 POWDER IN PACKET (EA) ORAL EVERY 6 HOURS
Status: DISCONTINUED | OUTPATIENT
Start: 2021-10-29 | End: 2021-10-29 | Stop reason: HOSPADM

## 2021-10-29 RX ORDER — POTASSIUM CHLORIDE 20 MEQ/1
40 TABLET, EXTENDED RELEASE ORAL EVERY 4 HOURS
Status: COMPLETED | OUTPATIENT
Start: 2021-10-29 | End: 2021-10-29

## 2021-10-29 RX ORDER — IRON,CARB/VIT C/VIT B12/FOLIC 100-250-1
1 TABLET ORAL DAILY
Qty: 30 TABLET | Refills: 0 | Status: SHIPPED | OUTPATIENT
Start: 2021-10-29 | End: 2022-01-21

## 2021-10-29 RX ORDER — LANOLIN ALCOHOL/MO/W.PET/CERES
400 CREAM (GRAM) TOPICAL ONCE
Status: COMPLETED | OUTPATIENT
Start: 2021-10-29 | End: 2021-10-29

## 2021-10-29 RX ORDER — TRAMADOL HYDROCHLORIDE 50 MG/1
50 TABLET ORAL EVERY 6 HOURS
Qty: 20 TABLET | Refills: 0 | Status: SHIPPED | OUTPATIENT
Start: 2021-10-29 | End: 2022-01-21

## 2021-10-29 RX ORDER — BUPIVACAINE HYDROCHLORIDE 5 MG/ML
INJECTION, SOLUTION EPIDURAL; INTRACAUDAL
Status: DISCONTINUED | OUTPATIENT
Start: 2021-10-28 | End: 2021-10-29 | Stop reason: HOSPADM

## 2021-10-29 RX ADMIN — PREGABALIN 75 MG: 75 CAPSULE ORAL at 09:10

## 2021-10-29 RX ADMIN — KETOROLAC TROMETHAMINE 15 MG: 30 INJECTION, SOLUTION INTRAMUSCULAR at 12:10

## 2021-10-29 RX ADMIN — POTASSIUM & SODIUM PHOSPHATES POWDER PACK 280-160-250 MG 1 PACKET: 280-160-250 PACK at 11:10

## 2021-10-29 RX ADMIN — IRON SUCROSE 100 MG: 20 INJECTION, SOLUTION INTRAVENOUS at 09:10

## 2021-10-29 RX ADMIN — KETOROLAC TROMETHAMINE 15 MG: 30 INJECTION, SOLUTION INTRAMUSCULAR at 01:10

## 2021-10-29 RX ADMIN — PANTOPRAZOLE SODIUM 40 MG: 40 TABLET, DELAYED RELEASE ORAL at 09:10

## 2021-10-29 RX ADMIN — Medication 400 MG: at 11:10

## 2021-10-29 RX ADMIN — METHOCARBAMOL 1000 MG: 100 INJECTION INTRAMUSCULAR; INTRAVENOUS at 02:10

## 2021-10-29 RX ADMIN — MUPIROCIN: 20 OINTMENT TOPICAL at 09:10

## 2021-10-29 RX ADMIN — POTASSIUM CHLORIDE 40 MEQ: 1500 TABLET, EXTENDED RELEASE ORAL at 11:10

## 2021-10-29 RX ADMIN — LEVOTHYROXINE SODIUM 50 MCG: 50 TABLET ORAL at 05:10

## 2021-10-29 RX ADMIN — METHOCARBAMOL 1000 MG: 100 INJECTION INTRAMUSCULAR; INTRAVENOUS at 05:10

## 2021-10-29 RX ADMIN — OXYCODONE HYDROCHLORIDE AND ACETAMINOPHEN 1 TABLET: 5; 325 TABLET ORAL at 07:10

## 2021-10-29 RX ADMIN — POTASSIUM CHLORIDE 40 MEQ: 1500 TABLET, EXTENDED RELEASE ORAL at 02:10

## 2021-10-30 LAB — PANCREASTATIN SERPL-MCNC: 51 PG/ML (ref 10–135)

## 2021-11-01 LAB
FINAL PATHOLOGIC DIAGNOSIS: NORMAL
FROZEN SECTION DIAGNOSIS: NORMAL
FROZEN SECTION FOOTNOTE: NORMAL
GROSS: NORMAL
Lab: NORMAL
MICROSCOPIC EXAM: NORMAL

## 2021-11-03 ENCOUNTER — PATIENT MESSAGE (OUTPATIENT)
Dept: NEUROLOGY | Facility: HOSPITAL | Age: 56
End: 2021-11-03
Payer: COMMERCIAL

## 2021-11-03 DIAGNOSIS — D35.02 PHEOCHROMOCYTOMA OF LEFT ADRENAL GLAND: Primary | ICD-10-CM

## 2021-11-03 RX ORDER — ONDANSETRON HYDROCHLORIDE 8 MG/1
8 TABLET, FILM COATED ORAL EVERY 8 HOURS PRN
Qty: 30 TABLET | Refills: 0 | Status: SHIPPED | OUTPATIENT
Start: 2021-11-03 | End: 2021-12-27 | Stop reason: SDUPTHER

## 2021-11-04 ENCOUNTER — PATIENT MESSAGE (OUTPATIENT)
Dept: NEUROLOGY | Facility: HOSPITAL | Age: 56
End: 2021-11-04
Payer: COMMERCIAL

## 2021-11-06 ENCOUNTER — NURSE TRIAGE (OUTPATIENT)
Dept: ADMINISTRATIVE | Facility: CLINIC | Age: 56
End: 2021-11-06
Payer: COMMERCIAL

## 2021-11-07 ENCOUNTER — PATIENT MESSAGE (OUTPATIENT)
Dept: NEUROLOGY | Facility: HOSPITAL | Age: 56
End: 2021-11-07
Payer: COMMERCIAL

## 2021-11-08 ENCOUNTER — PATIENT MESSAGE (OUTPATIENT)
Dept: NEUROLOGY | Facility: HOSPITAL | Age: 56
End: 2021-11-08
Payer: COMMERCIAL

## 2021-11-12 ENCOUNTER — LAB VISIT (OUTPATIENT)
Dept: LAB | Facility: HOSPITAL | Age: 56
End: 2021-11-12
Attending: SURGERY
Payer: COMMERCIAL

## 2021-11-12 ENCOUNTER — OFFICE VISIT (OUTPATIENT)
Dept: NEUROLOGY | Facility: HOSPITAL | Age: 56
End: 2021-11-12
Attending: SURGERY
Payer: COMMERCIAL

## 2021-11-12 VITALS
HEART RATE: 90 BPM | WEIGHT: 210 LBS | SYSTOLIC BLOOD PRESSURE: 154 MMHG | BODY MASS INDEX: 38.64 KG/M2 | TEMPERATURE: 99 F | HEIGHT: 62 IN | DIASTOLIC BLOOD PRESSURE: 89 MMHG

## 2021-11-12 DIAGNOSIS — D35.00 PHEOCHROMOCYTOMA, UNSPECIFIED LATERALITY: ICD-10-CM

## 2021-11-12 DIAGNOSIS — D35.02 PHEOCHROMOCYTOMA OF LEFT ADRENAL GLAND: ICD-10-CM

## 2021-11-12 DIAGNOSIS — C74.92: ICD-10-CM

## 2021-11-12 DIAGNOSIS — D35.02 PHEOCHROMOCYTOMA OF LEFT ADRENAL GLAND: Primary | ICD-10-CM

## 2021-11-12 LAB
ALBUMIN SERPL BCP-MCNC: 4.1 G/DL (ref 3.5–5.2)
ALP SERPL-CCNC: 72 U/L (ref 55–135)
ALT SERPL W/O P-5'-P-CCNC: 22 U/L (ref 10–44)
ANION GAP SERPL CALC-SCNC: 14 MMOL/L (ref 8–16)
AST SERPL-CCNC: 22 U/L (ref 10–40)
BASOPHILS # BLD AUTO: 0.08 K/UL (ref 0–0.2)
BASOPHILS NFR BLD: 1 % (ref 0–1.9)
BILIRUB SERPL-MCNC: 0.3 MG/DL (ref 0.1–1)
BUN SERPL-MCNC: 18 MG/DL (ref 6–20)
CALCIUM SERPL-MCNC: 9.7 MG/DL (ref 8.7–10.5)
CHLORIDE SERPL-SCNC: 100 MMOL/L (ref 95–110)
CO2 SERPL-SCNC: 23 MMOL/L (ref 23–29)
CREAT SERPL-MCNC: 0.7 MG/DL (ref 0.5–1.4)
DIFFERENTIAL METHOD: ABNORMAL
EOSINOPHIL # BLD AUTO: 0.2 K/UL (ref 0–0.5)
EOSINOPHIL NFR BLD: 3.1 % (ref 0–8)
ERYTHROCYTE [DISTWIDTH] IN BLOOD BY AUTOMATED COUNT: 13.5 % (ref 11.5–14.5)
EST. GFR  (AFRICAN AMERICAN): >60 ML/MIN/1.73 M^2
EST. GFR  (NON AFRICAN AMERICAN): >60 ML/MIN/1.73 M^2
GLUCOSE SERPL-MCNC: 124 MG/DL (ref 70–110)
HCT VFR BLD AUTO: 41.6 % (ref 37–48.5)
HGB BLD-MCNC: 14.3 G/DL (ref 12–16)
IMM GRANULOCYTES # BLD AUTO: 0.02 K/UL (ref 0–0.04)
IMM GRANULOCYTES NFR BLD AUTO: 0.3 % (ref 0–0.5)
LYMPHOCYTES # BLD AUTO: 2.5 K/UL (ref 1–4.8)
LYMPHOCYTES NFR BLD: 31.8 % (ref 18–48)
MCH RBC QN AUTO: 32.1 PG (ref 27–31)
MCHC RBC AUTO-ENTMCNC: 34.4 G/DL (ref 32–36)
MCV RBC AUTO: 93 FL (ref 82–98)
MONOCYTES # BLD AUTO: 0.6 K/UL (ref 0.3–1)
MONOCYTES NFR BLD: 7.9 % (ref 4–15)
NEUTROPHILS # BLD AUTO: 4.4 K/UL (ref 1.8–7.7)
NEUTROPHILS NFR BLD: 55.9 % (ref 38–73)
NRBC BLD-RTO: 0 /100 WBC
PLATELET # BLD AUTO: 453 K/UL (ref 150–450)
PMV BLD AUTO: 10 FL (ref 9.2–12.9)
POTASSIUM SERPL-SCNC: 3.5 MMOL/L (ref 3.5–5.1)
PROT SERPL-MCNC: 7.7 G/DL (ref 6–8.4)
RBC # BLD AUTO: 4.46 M/UL (ref 4–5.4)
SODIUM SERPL-SCNC: 137 MMOL/L (ref 136–145)
WBC # BLD AUTO: 7.85 K/UL (ref 3.9–12.7)

## 2021-11-12 PROCEDURE — 81404 MOPATH PROCEDURE LEVEL 5: CPT | Performed by: SURGERY

## 2021-11-12 PROCEDURE — 85025 COMPLETE CBC W/AUTO DIFF WBC: CPT | Performed by: SURGERY

## 2021-11-12 PROCEDURE — 36415 COLL VENOUS BLD VENIPUNCTURE: CPT | Performed by: SURGERY

## 2021-11-12 PROCEDURE — 99214 OFFICE O/P EST MOD 30 MIN: CPT | Performed by: SURGERY

## 2021-11-12 PROCEDURE — 81405 MOPATH PROCEDURE LEVEL 6: CPT | Performed by: SURGERY

## 2021-11-12 PROCEDURE — 80053 COMPREHEN METABOLIC PANEL: CPT | Performed by: SURGERY

## 2021-11-12 RX ORDER — HYDROCODONE BITARTRATE AND ACETAMINOPHEN 7.5; 325 MG/1; MG/1
1 TABLET ORAL EVERY 6 HOURS PRN
Qty: 30 TABLET | Refills: 0 | Status: SHIPPED | OUTPATIENT
Start: 2021-11-12

## 2021-11-12 RX ORDER — PROMETHAZINE HYDROCHLORIDE 12.5 MG/1
12.5 TABLET ORAL EVERY 6 HOURS PRN
Qty: 30 TABLET | Refills: 0 | Status: SHIPPED | OUTPATIENT
Start: 2021-11-12

## 2021-11-15 ENCOUNTER — PATIENT MESSAGE (OUTPATIENT)
Dept: NEUROLOGY | Facility: HOSPITAL | Age: 56
End: 2021-11-15
Payer: COMMERCIAL

## 2021-11-18 ENCOUNTER — PATIENT MESSAGE (OUTPATIENT)
Dept: NEUROLOGY | Facility: HOSPITAL | Age: 56
End: 2021-11-18
Payer: COMMERCIAL

## 2021-11-19 ENCOUNTER — TELEPHONE (OUTPATIENT)
Dept: NEUROLOGY | Facility: HOSPITAL | Age: 56
End: 2021-11-19
Payer: COMMERCIAL

## 2021-11-19 ENCOUNTER — PATIENT MESSAGE (OUTPATIENT)
Dept: NEUROLOGY | Facility: HOSPITAL | Age: 56
End: 2021-11-19
Payer: COMMERCIAL

## 2021-11-19 DIAGNOSIS — D35.02 PHEOCHROMOCYTOMA OF LEFT ADRENAL GLAND: Primary | ICD-10-CM

## 2021-11-19 RX ORDER — METOCLOPRAMIDE HYDROCHLORIDE 5 MG/5ML
10 SOLUTION ORAL 4 TIMES DAILY
Qty: 1200 ML | Refills: 0 | Status: SHIPPED | OUTPATIENT
Start: 2021-11-19 | End: 2021-12-01

## 2021-11-22 ENCOUNTER — PATIENT MESSAGE (OUTPATIENT)
Dept: NEUROLOGY | Facility: HOSPITAL | Age: 56
End: 2021-11-22
Payer: COMMERCIAL

## 2021-11-24 ENCOUNTER — PATIENT MESSAGE (OUTPATIENT)
Dept: NEUROLOGY | Facility: HOSPITAL | Age: 56
End: 2021-11-24
Payer: COMMERCIAL

## 2021-11-24 DIAGNOSIS — C7B.8 METASTATIC MALIGNANT NEUROENDOCRINE TUMOR TO LYMPH NODE: ICD-10-CM

## 2021-11-24 DIAGNOSIS — D35.02 PHEOCHROMOCYTOMA OF LEFT ADRENAL GLAND: Primary | ICD-10-CM

## 2021-11-24 RX ORDER — SULFAMETHOXAZOLE AND TRIMETHOPRIM 800; 160 MG/1; MG/1
1 TABLET ORAL 2 TIMES DAILY
Qty: 20 TABLET | Refills: 0 | Status: SHIPPED | OUTPATIENT
Start: 2021-11-24 | End: 2021-12-04

## 2021-11-28 ENCOUNTER — PATIENT MESSAGE (OUTPATIENT)
Dept: NEUROLOGY | Facility: HOSPITAL | Age: 56
End: 2021-11-28
Payer: COMMERCIAL

## 2021-11-29 ENCOUNTER — PATIENT MESSAGE (OUTPATIENT)
Dept: NEUROLOGY | Facility: HOSPITAL | Age: 56
End: 2021-11-29
Payer: COMMERCIAL

## 2021-11-30 DIAGNOSIS — C74.92: ICD-10-CM

## 2021-11-30 DIAGNOSIS — R11.0 NAUSEA: ICD-10-CM

## 2021-11-30 DIAGNOSIS — D35.02 PHEOCHROMOCYTOMA OF LEFT ADRENAL GLAND: Primary | ICD-10-CM

## 2021-11-30 DIAGNOSIS — D35.00 PHEOCHROMOCYTOMA, UNSPECIFIED LATERALITY: ICD-10-CM

## 2021-12-01 ENCOUNTER — TELEPHONE (OUTPATIENT)
Dept: NEUROLOGY | Facility: HOSPITAL | Age: 56
End: 2021-12-01
Payer: COMMERCIAL

## 2021-12-01 RX ORDER — SCOLOPAMINE TRANSDERMAL SYSTEM 1 MG/1
1 PATCH, EXTENDED RELEASE TRANSDERMAL
Qty: 10 PATCH | Refills: 0 | Status: SHIPPED | OUTPATIENT
Start: 2021-12-01 | End: 2021-12-31

## 2021-12-02 ENCOUNTER — PATIENT MESSAGE (OUTPATIENT)
Dept: NEUROLOGY | Facility: HOSPITAL | Age: 56
End: 2021-12-02
Payer: COMMERCIAL

## 2021-12-09 ENCOUNTER — PATIENT MESSAGE (OUTPATIENT)
Dept: NEUROLOGY | Facility: HOSPITAL | Age: 56
End: 2021-12-09
Payer: COMMERCIAL

## 2021-12-09 DIAGNOSIS — C7B.8 METASTATIC MALIGNANT NEUROENDOCRINE TUMOR TO LYMPH NODE: Primary | ICD-10-CM

## 2021-12-09 DIAGNOSIS — D35.02 PHEOCHROMOCYTOMA OF LEFT ADRENAL GLAND: ICD-10-CM

## 2021-12-10 LAB — MEN 1 GENE SEQUENCING AND DEL/DUP: NORMAL

## 2021-12-13 DIAGNOSIS — C7B.8 METASTATIC MALIGNANT NEUROENDOCRINE TUMOR TO LYMPH NODE: ICD-10-CM

## 2021-12-13 DIAGNOSIS — D35.02 PHEOCHROMOCYTOMA OF LEFT ADRENAL GLAND: Primary | ICD-10-CM

## 2021-12-14 ENCOUNTER — PATIENT MESSAGE (OUTPATIENT)
Dept: NEUROLOGY | Facility: HOSPITAL | Age: 56
End: 2021-12-14
Payer: COMMERCIAL

## 2021-12-16 ENCOUNTER — PATIENT MESSAGE (OUTPATIENT)
Dept: NEUROLOGY | Facility: HOSPITAL | Age: 56
End: 2021-12-16
Payer: COMMERCIAL

## 2021-12-16 RX ORDER — PANTOPRAZOLE SODIUM 40 MG/1
40 TABLET, DELAYED RELEASE ORAL DAILY
Qty: 30 TABLET | Refills: 11 | Status: SHIPPED | OUTPATIENT
Start: 2021-12-16 | End: 2022-12-16

## 2021-12-17 ENCOUNTER — PATIENT MESSAGE (OUTPATIENT)
Dept: NEUROLOGY | Facility: HOSPITAL | Age: 56
End: 2021-12-17
Payer: COMMERCIAL

## 2021-12-20 ENCOUNTER — HOSPITAL ENCOUNTER (OUTPATIENT)
Dept: RADIOLOGY | Facility: HOSPITAL | Age: 56
Discharge: HOME | End: 2021-12-20
Attending: SURGERY
Payer: COMMERCIAL

## 2021-12-20 ENCOUNTER — PATIENT MESSAGE (OUTPATIENT)
Dept: NEUROLOGY | Facility: HOSPITAL | Age: 56
End: 2021-12-20
Payer: COMMERCIAL

## 2021-12-20 DIAGNOSIS — C7B.8 METASTATIC MALIGNANT NEUROENDOCRINE TUMOR TO LYMPH NODE: ICD-10-CM

## 2021-12-20 DIAGNOSIS — D35.02 PHEOCHROMOCYTOMA OF LEFT ADRENAL GLAND: ICD-10-CM

## 2021-12-20 PROCEDURE — 74177 CT ABD & PELVIS W/CONTRAST: CPT | Mod: 26,,, | Performed by: RADIOLOGY

## 2021-12-20 PROCEDURE — 74177 CT ABD & PELVIS W/CONTRAST: CPT | Mod: TC,PO

## 2021-12-20 PROCEDURE — 25500020 PHARM REV CODE 255: Mod: PO | Performed by: SURGERY

## 2021-12-20 PROCEDURE — 74177 CT ABDOMEN PELVIS WITH CONTRAST: ICD-10-PCS | Mod: 26,,, | Performed by: RADIOLOGY

## 2021-12-20 RX ADMIN — IOHEXOL 100 ML: 350 INJECTION, SOLUTION INTRAVENOUS at 02:12

## 2021-12-21 ENCOUNTER — PATIENT MESSAGE (OUTPATIENT)
Dept: NEUROLOGY | Facility: HOSPITAL | Age: 56
End: 2021-12-21
Payer: COMMERCIAL

## 2021-12-27 ENCOUNTER — PATIENT MESSAGE (OUTPATIENT)
Dept: NEUROLOGY | Facility: HOSPITAL | Age: 56
End: 2021-12-27
Payer: COMMERCIAL

## 2021-12-27 DIAGNOSIS — D35.02 PHEOCHROMOCYTOMA OF LEFT ADRENAL GLAND: ICD-10-CM

## 2021-12-29 RX ORDER — ONDANSETRON HYDROCHLORIDE 8 MG/1
8 TABLET, FILM COATED ORAL EVERY 8 HOURS PRN
Qty: 30 TABLET | Refills: 0 | Status: SHIPPED | OUTPATIENT
Start: 2021-12-29

## 2022-01-19 ENCOUNTER — PATIENT MESSAGE (OUTPATIENT)
Dept: NEUROLOGY | Facility: HOSPITAL | Age: 57
End: 2022-01-19
Payer: COMMERCIAL

## 2022-01-20 ENCOUNTER — DOCUMENTATION ONLY (OUTPATIENT)
Dept: NEUROLOGY | Facility: HOSPITAL | Age: 57
End: 2022-01-20
Payer: COMMERCIAL

## 2022-01-20 ENCOUNTER — LAB VISIT (OUTPATIENT)
Dept: LAB | Facility: HOSPITAL | Age: 57
End: 2022-01-20
Attending: SURGERY
Payer: COMMERCIAL

## 2022-01-20 ENCOUNTER — PATIENT MESSAGE (OUTPATIENT)
Dept: NEUROLOGY | Facility: HOSPITAL | Age: 57
End: 2022-01-20
Payer: COMMERCIAL

## 2022-01-20 DIAGNOSIS — C7B.8 METASTATIC MALIGNANT NEUROENDOCRINE TUMOR TO LYMPH NODE: ICD-10-CM

## 2022-01-20 DIAGNOSIS — D35.02 PHEOCHROMOCYTOMA OF LEFT ADRENAL GLAND: ICD-10-CM

## 2022-01-20 LAB
ALBUMIN SERPL BCP-MCNC: 3.6 G/DL (ref 3.5–5.2)
ALP SERPL-CCNC: 60 U/L (ref 55–135)
ALT SERPL W/O P-5'-P-CCNC: 26 U/L (ref 10–44)
ANION GAP SERPL CALC-SCNC: 12 MMOL/L (ref 8–16)
AST SERPL-CCNC: 30 U/L (ref 10–40)
BASOPHILS # BLD AUTO: 0.04 K/UL (ref 0–0.2)
BASOPHILS NFR BLD: 0.5 % (ref 0–1.9)
BILIRUB SERPL-MCNC: 0.4 MG/DL (ref 0.1–1)
BUN SERPL-MCNC: 11 MG/DL (ref 6–20)
CALCIUM SERPL-MCNC: 9.4 MG/DL (ref 8.7–10.5)
CHLORIDE SERPL-SCNC: 102 MMOL/L (ref 95–110)
CO2 SERPL-SCNC: 25 MMOL/L (ref 23–29)
CREAT SERPL-MCNC: 0.6 MG/DL (ref 0.5–1.4)
DIFFERENTIAL METHOD: ABNORMAL
EOSINOPHIL # BLD AUTO: 0.1 K/UL (ref 0–0.5)
EOSINOPHIL NFR BLD: 1.2 % (ref 0–8)
ERYTHROCYTE [DISTWIDTH] IN BLOOD BY AUTOMATED COUNT: 12.6 % (ref 11.5–14.5)
EST. GFR  (AFRICAN AMERICAN): >60 ML/MIN/1.73 M^2
EST. GFR  (NON AFRICAN AMERICAN): >60 ML/MIN/1.73 M^2
GLUCOSE SERPL-MCNC: 88 MG/DL (ref 70–110)
HCT VFR BLD AUTO: 44.2 % (ref 37–48.5)
HGB BLD-MCNC: 15.1 G/DL (ref 12–16)
IMM GRANULOCYTES # BLD AUTO: 0.02 K/UL (ref 0–0.04)
IMM GRANULOCYTES NFR BLD AUTO: 0.2 % (ref 0–0.5)
LYMPHOCYTES # BLD AUTO: 2.4 K/UL (ref 1–4.8)
LYMPHOCYTES NFR BLD: 29.3 % (ref 18–48)
MAGNESIUM SERPL-MCNC: 1.7 MG/DL (ref 1.6–2.6)
MCH RBC QN AUTO: 31.2 PG (ref 27–31)
MCHC RBC AUTO-ENTMCNC: 34.2 G/DL (ref 32–36)
MCV RBC AUTO: 91 FL (ref 82–98)
MONOCYTES # BLD AUTO: 0.6 K/UL (ref 0.3–1)
MONOCYTES NFR BLD: 7.1 % (ref 4–15)
NEUTROPHILS # BLD AUTO: 5 K/UL (ref 1.8–7.7)
NEUTROPHILS NFR BLD: 61.7 % (ref 38–73)
NRBC BLD-RTO: 0 /100 WBC
PHOSPHATE SERPL-MCNC: 3.7 MG/DL (ref 2.7–4.5)
PLATELET # BLD AUTO: 289 K/UL (ref 150–450)
PMV BLD AUTO: 11.3 FL (ref 9.2–12.9)
POTASSIUM SERPL-SCNC: 4.1 MMOL/L (ref 3.5–5.1)
PROT SERPL-MCNC: 7.3 G/DL (ref 6–8.4)
RBC # BLD AUTO: 4.84 M/UL (ref 4–5.4)
SODIUM SERPL-SCNC: 139 MMOL/L (ref 136–145)
WBC # BLD AUTO: 8.17 K/UL (ref 3.9–12.7)

## 2022-01-20 PROCEDURE — 85025 COMPLETE CBC W/AUTO DIFF WBC: CPT | Performed by: SURGERY

## 2022-01-20 PROCEDURE — 36415 COLL VENOUS BLD VENIPUNCTURE: CPT | Performed by: SURGERY

## 2022-01-20 PROCEDURE — 83735 ASSAY OF MAGNESIUM: CPT | Performed by: SURGERY

## 2022-01-20 PROCEDURE — 83835 ASSAY OF METANEPHRINES: CPT | Performed by: SURGERY

## 2022-01-20 PROCEDURE — 80053 COMPREHEN METABOLIC PANEL: CPT | Performed by: SURGERY

## 2022-01-20 PROCEDURE — 84100 ASSAY OF PHOSPHORUS: CPT | Performed by: SURGERY

## 2022-01-20 PROCEDURE — 82384 ASSAY THREE CATECHOLAMINES: CPT | Performed by: SURGERY

## 2022-01-20 NOTE — PROGRESS NOTES
Patient stopped by the office. Stated her MIBG Scan was canceled this morning, due to the dosage not available. Patient states she still wants to come in on tomorrow as she is still having problems with elevated blood pressure, fatigue and nausea. Blood pressure taken while in the office. 168/96 right arm sitting medium cuff. Pulse 78. Patient stated she is not currently taking any blood pressure meds. Patient stated she did feel weak, but had been fasting due to testing and labs. Offered patient juice and snack, but stated she was going to breakfast with her sister, who was accompanying her.  All questions were answered at this time.

## 2022-01-21 ENCOUNTER — OFFICE VISIT (OUTPATIENT)
Dept: NEUROLOGY | Facility: HOSPITAL | Age: 57
End: 2022-01-21
Attending: SURGERY
Payer: COMMERCIAL

## 2022-01-21 VITALS
HEIGHT: 62 IN | BODY MASS INDEX: 35.51 KG/M2 | HEART RATE: 80 BPM | TEMPERATURE: 97 F | SYSTOLIC BLOOD PRESSURE: 147 MMHG | DIASTOLIC BLOOD PRESSURE: 86 MMHG | WEIGHT: 193 LBS

## 2022-01-21 DIAGNOSIS — D35.02 PHEOCHROMOCYTOMA OF LEFT ADRENAL GLAND: ICD-10-CM

## 2022-01-21 DIAGNOSIS — C7B.8 METASTATIC MALIGNANT NEUROENDOCRINE TUMOR TO LYMPH NODE: Primary | ICD-10-CM

## 2022-01-21 PROCEDURE — 99214 OFFICE O/P EST MOD 30 MIN: CPT | Performed by: SURGERY

## 2022-01-21 RX ORDER — PROCHLORPERAZINE MALEATE 10 MG
10 TABLET ORAL 3 TIMES DAILY PRN
Qty: 90 TABLET | Refills: 0 | Status: SHIPPED | OUTPATIENT
Start: 2022-01-21 | End: 2022-02-20

## 2022-01-21 NOTE — PROGRESS NOTES
"NOLANETS:  [facility]  A collaboration between [facility] and [facility]      PATIENT: Marcella Chen  MRN: [id number]  DATE: 1/21/2022    Subjective:      Chief Complaint: Follow-up.  Complains of persistent nausea and episodic hypertension.      Vitals: Blood pressure (!) 147/86, pulse 80, temperature 97.2 °F (36.2 °C), height 5' 2" (1.575 m), weight 87.5 kg (193 lb 0.2 oz). --    ECOG Score: 1 - Symptomatic but completely ambulatory    Diagnosis:   1. 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 Pheochromocytoma of left adrenal gland         Chief Complaint:  post op follow up c/o abdominal pain and nausea. Ran out of pain meds, zofran does not work. She says he gets this every time she has an operation.  Still c/o episodic nausea and episodic hypertension. 29 lb wt loss since surgery.  Nausea did not seem to have relation to meals.  Had an episode of blood pressure 169/129 yesterday.  Improved after resuming blood pressure medication.  Was to have an MIBG scan done today.  MIBG not done, no isotope available.    S/p:   10/25/2021 - Lymphadenectomy, Retroperitoneum and Laparotomy, Exploratory     Pathology:   1. Infrarenal periaortic lymph node #1:  Metastatic pheochromocytoma involving one of one lymph node (1/1).  2. Recurrent pheochromocytoma vs fat:  Fibroadipose tissue, with no diagnostic abnormality.  Negative for tumor.  3. Infrarenal periaortic lymph node #2:  Metastatic pheochromocytoma involving one of one lymph node (1/1).  4. Suprarenal periaortic lymph node:  Metastatic pheochromocytoma involving fibroadipose tissue and three of three lymph nodes (3/3).  5. Additional periaortic lymph node:  Metastatic pheochromocytoma involving one of two lymph nodes (1/2).  6. Additional retroaortic tissue  Fibroadipose tissue, with no diagnostic abnormality.  Negative for tumor.  7. Liver, " biopsy:  Moderate macrovesicular steatosis associated with mildly active steatohepatitis.            Oncologic History:   Oncologic History Pheochromocytoma 2013   Oncologic Treatment Left adrenalectomy  [place]  10/25/2021 left retroperitoneal node dissection for recurrence   Pathology 2013 adrenal cortical tumor consistent with pheochromocytoma  Lymph nodes consistent with metastatic pheochromocytoma     Past Medical History:  Past Medical History:   Diagnosis Date    Anemia     Arthritis     GERD (gastroesophageal reflux disease)     HLD (hyperlipidemia)     HTN (hypertension)     Hypothyroidism     Labile blood pressure     Migraines     Pheochromocytoma        Past Surgical History:  Past Surgical History:   Procedure Laterality Date    ADRENAL GLAND SURGERY      Pheochromocytoma     SECTION      CHOLECYSTECTOMY      HYSTERECTOMY      with 1 ovary    OOPHORECTOMY      2nd ovary resected    RETROPERITONEAL LYMPHADENECTOMY N/A 10/25/2021    Procedure: LYMPHADENECTOMY, RETROPERITONEUM;  Surgeon: ALEJANDRO Mckeon MD;  Location: [facility];  Service: General;  Laterality: N/A;       Family History:  History reviewed. No pertinent family history.    Allergies:  Prazosin and Adhesive tape-silicones    Medications:  Current Outpatient Medications   Medication Sig    HYDROcodone-acetaminophen (NORCO) 7.5-325 mg per tablet Take 1 tablet by mouth every 6 (six) hours as needed for Pain.    levothyroxine (SYNTHROID) 50 MCG tablet     melatonin (MELATIN)     OMEPRAZOLE ORAL     ondansetron (ZOFRAN) 8 MG tablet Take 1 tablet (8 mg total) by mouth every 8 (eight) hours as needed for Nausea (Take 1 tablet every 8 hours as needed for nausea.).    pantoprazole (PROTONIX) 40 MG tablet Take 1 tablet (40 mg total) by mouth once daily.    PENNSAID 20 mg/gram /actuation(2 %) sopm APPLY 2 PUMPS TO THE AFFECTED AREA TWICE DAILY    promethazine (PHENERGAN) 12.5 MG Tab Take 1  tablet (12.5 mg total) by mouth every 6 (six) hours as needed (As needed for Nausea). (Patient not taking: Reported on 1/21/2022)     No current facility-administered medications for this visit.        Review of Systems   Objective:      Physical Exam   Assessment:       1. 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 Pheochromocytoma of left adrenal gland        Laboratory Data:    Scans:   CT Abdomen Pelvis With Contrast - Triple Phase  Narrative: EXAMINATION:  CT ABDOMEN PELVIS WITH CONTRAST    CLINICAL HISTORY:  Restage; Benign neoplasm of left adrenal gland    TECHNIQUE:  Low dose axial images, sagittal and coronal reformations were obtained from the lung bases to the pubic symphysis following the IV administration of 100 mL of Omnipaque 350 32 oz of water and a triple phase liver evaluation was performed    COMPARISON:  09/09/2021 CT and PET evaluation 10/19/2021    FINDINGS:  Cholecystectomy clips are noted.  Decreased liver density is noted as can be seen with fatty infiltration of the liver.  Within the right lobe of the liver along the gallbladder fossa a region of decreased liver density is noted that appears geographic spanning 1.5 cm axially without obvious evidence of enhancement favored relate to a region of focal fatty infiltration.    The left adrenal gland appears to have been removed with soft tissue density in its expected location suggestive of postsurgical change.  Extensive surgical clips are noted in this location.  The soft tissue density spans 5.2 by 3.6 cm axially with surgical clips extending in a para-aortic location along the aorta on the left as well.  It would be difficult to exclude neoplastic recurrence or involvement on this examination and continued PET follow-up is suggested.  The enlarged para-aortic node inferior to the left renal vessels on 10/19/2021 is not convincingly visualized.    Evidence of hysterectomy is noted.    Evidence of prior anterior abdominal  wall hernia repair is suspected with a fat containing hernia suspected along inferior umbilicus paracentric to the right along the incision line.    The pancreatic tail may be truncated.    The appendix is visualized and demonstrates no obvious inflammatory change.    Postsurgical changes are noted involving the lower lumbar spine.  With disc prostheses, spacers, and posterior fusion    Some coronary calcification is noted partially visualized  Impression: 1. Interval postsurgical change involving the left adrenal gland and para-aortic soft tissues with fluid/soft tissue density in this region.  The Hounsfield unit is most likely representative of fluid between 20 and 30.  This is favored relate to hematoma formation, residual neoplasm would be difficult to entirely exclude and follow-up for resolution would be suggested.  Continued fat PET-CT fusion follow-up would be suggested.  2. Decreased liver density as can be seen with fatty infiltration of the liver  3. Region of geographic decreased density along the gallbladder fossa    Electronically signed by: Marcus Milton MD  Date:    2021  Time:    15:53       Impression:  Persistent nausea beyond the perioperative period and early satiety  Warrants EGD to investigate.  Episodic hypertension worried about possible recurrence of  Malignant pheo versus essential hypertension    time interval from primary and 1st recurrence was several years in Ki-67 was low at 1% making recurrence of pheo less likely. She was negative on dotatate PET but positive on MIBG scan previously.  Patient and sister are concerned about her inheritable process, anot.  her sibling had anal neuroendocrine carcinoma and  after 21 months.  Blood pressure cuff was checked against ours and is accurate.    Plan:       Continue BP meds lisinopril 20 q.h.s.  MIBG scan  Refer to GI for EGD  Try Compazine 10mg  b.i.d. and Carafate liquid 1 g q.i.d. scheduled  Reglan 5 mg a.c.   RTC after  above    30 m review chart/see pt/coord care    ALEJANDRO Mckeon MD, FACS  Professor of Surgery, [facility]  Neuroendocrine Surgery, Hepatic/Pancreatic & General Surgery  [address], [address]  [place]  [address]  ph. [phone]; [phone]  fax. [phone]

## 2022-01-21 NOTE — PATIENT INSTRUCTIONS
Tumor Markers: every 3 months  --- due January 2022 and April 2022  Get lab work drawn at least 1 month prior to appointment.    Scans: MIBG Scan --- Scheduled at [facility]    Medications:   - Lisinopril 20 at bedtime  - Try Compazine 10mg  b.i.d. and Carafate liquid 1 g q.i.d. scheduled  - Reglan 5 mg a.c.    Consult: refer to GI for EGD (virtual appointment scheduled)    Return Clinic Appointment: in 2 weeks with Dr. Mckeon --- appointment made

## 2022-01-24 ENCOUNTER — PATIENT MESSAGE (OUTPATIENT)
Dept: NEUROLOGY | Facility: HOSPITAL | Age: 57
End: 2022-01-24
Payer: COMMERCIAL

## 2022-01-25 ENCOUNTER — PATIENT MESSAGE (OUTPATIENT)
Dept: NEUROLOGY | Facility: HOSPITAL | Age: 57
End: 2022-01-25
Payer: COMMERCIAL

## 2022-01-25 ENCOUNTER — TELEPHONE (OUTPATIENT)
Dept: NEUROLOGY | Facility: HOSPITAL | Age: 57
End: 2022-01-25
Payer: COMMERCIAL

## 2022-01-25 NOTE — TELEPHONE ENCOUNTER
----- Message from Anna Tamayo sent at 1/25/2022 12:36 PM CST -----  Contact: Marcella [phone]  Type: Requesting Call Back    Who called:  Marcella Landa Call Back Number:  [phone]  Additional Information:  requesting call back regarding high BP please

## 2022-01-25 NOTE — TELEPHONE ENCOUNTER
Spoke with patient, advised that Dr. Mckeon recommend she start on 20mg of Lisinopril 2x daily. She will touch base with our office on Friday morning, prior to the weekend. Patient verbalized her understanding and has no further questions at this time.

## 2022-01-26 ENCOUNTER — TELEPHONE (OUTPATIENT)
Dept: NEUROLOGY | Facility: HOSPITAL | Age: 57
End: 2022-01-26
Payer: COMMERCIAL

## 2022-01-26 ENCOUNTER — PATIENT MESSAGE (OUTPATIENT)
Dept: NEUROLOGY | Facility: HOSPITAL | Age: 57
End: 2022-01-26

## 2022-01-26 ENCOUNTER — OFFICE VISIT (OUTPATIENT)
Dept: NEUROLOGY | Facility: HOSPITAL | Age: 57
End: 2022-01-26
Attending: INTERNAL MEDICINE
Payer: COMMERCIAL

## 2022-01-26 DIAGNOSIS — R11.0 NAUSEA: Primary | ICD-10-CM

## 2022-01-26 RX ORDER — METOCLOPRAMIDE 10 MG/1
TABLET ORAL
Qty: 63 TABLET | Refills: 0 | Status: SHIPPED | OUTPATIENT
Start: 2022-01-26

## 2022-01-26 NOTE — TELEPHONE ENCOUNTER
"PT requesting earlier date for egd than date given during audio visit.  Pt notified earlier date not available.  Pt confirmed egd on Monday, March 7, 2022  Pt given egd instructions and notified Endoscopy staff will contact 2-3 days prior to procedure with arrival time. Pt notified pre procedure covid 19 test required 72 hours prior to procedure.  Pt states "I'm not coming to do that".  Informed rapid covid test will be completed upon arrival.  Acknowledged understanding.  You are scheduled for an EGD on ____Monday, March 7, 2022 at [facility] at [address] [place] [address]  [id number]_____________________________    You should eat light meals the day before the procedure and nothing to eat or drink after midnight the night before your procedure.    You will need to be at the 1st floor admission desk at the hospital on ________________________    "

## 2022-01-26 NOTE — PROGRESS NOTES
[facility]:- Audio Only Telehealth Visit      The patient location is: Home  The chief complaint leading to consultation is: Nausea  Visit type: Virtual visit with audio only (telephone)  Total time spent with patient: 20 minutes     The reason for the audio only service rather than synchronous audio and video virtual visit was related to technical difficulties or patient preference/necessity.     Each patient to whom I provide medical services by telemedicine is:  (1) informed of the relationship between the physician and patient and the respective role of any other health care provider with respect to management of the patient; and (2) notified that they may decline to receive medical services by telemedicine and may withdraw from such care at any time. Patient verbally consented to receive this service via voice-only telephone call.      HPI: 57 year old woman with metastatic pheochromocytoma of left adrenal gland and lymph nodes s/p lymphadenectomy with ex lap 10/2021 who presents for chronic, persistent severe nausea with associated weight loss of 47 lbs since 2021 and early satiety. She complains of severe nausea since her surgery and feels full after consuming only 3-4 bites. She is taking Tylenol, ibuprofen as needed on average 3 times weekly, and taking zofran and compazine for nausea which helps 50%. She has had multiple abdominal surgeries in the past including , cholecystectomy, back surgery.    CT A/P 21:  Post-surgical changes with hematoma formation, fatty infiltration of the liver    Assessment and plan:  57 year old woman with metastatic pheochromocytoma of left adrenal gland and lymph nodes s/p lymphadenectomy with ex lap 10/2021 who presents for chronic, persistent severe nausea with associated weight loss of 47 lbs since 2021 and early satiety. Her constellation of symptoms are concerning for gastric outlet obstruction. Recommend EGD to evaluate for  gastric outlet obstruction or malignant obstruction which could potentially be due to delayed small bowel emptying from tumor infiltration. This is a new condition with uncertain prognosis.    - Trial of Reglan 10 mg TID x 3 weeks.  - Schedule telemedicine follow up in 3 weeks to reassess symptoms.  - Recommend EGD with H pylori biopsies on March 7, 2022 to evaluate for GOO/malignant obstruction.     This service was not originating from a related E/M service provided within the previous 7 days nor will  to an E/M service or procedure within the next 24 hours or my soonest available appointment.  Prevailing standard of care was able to be met in this audio-only visit.

## 2022-01-26 NOTE — TELEPHONE ENCOUNTER
----- Message from Letha Lorenz MA sent at 1/26/2022 11:18 AM CST -----  Type:  Needs Medical Advice    Who Called: pt  Regarding: discuss appt  Would the patient rather a call back or a response via [facility]? Call back  Best Call Back Number: [phone]  Additional Information: n/a

## 2022-01-28 ENCOUNTER — PATIENT MESSAGE (OUTPATIENT)
Dept: NEUROLOGY | Facility: HOSPITAL | Age: 57
End: 2022-01-28
Payer: COMMERCIAL

## 2022-01-29 LAB
METANEPH FREE SERPL-MCNC: <25 PG/ML
METANEPHS SERPL-MCNC: 207 PG/ML
NORMETANEPH FREE SERPL-MCNC: 207 PG/ML

## 2022-01-30 LAB
CATECHOLS PLAS-MCNC: 675 PG/ML
DOPAMINE SERPL-MCNC: <20 PG/ML
EPINEPH PLAS-MCNC: <40 PG/ML
NOREPINEPH PLAS-MCNC: 675 PG/ML

## 2022-01-31 ENCOUNTER — PATIENT MESSAGE (OUTPATIENT)
Dept: NEUROLOGY | Facility: HOSPITAL | Age: 57
End: 2022-01-31
Payer: COMMERCIAL

## 2022-02-01 ENCOUNTER — HOSPITAL ENCOUNTER (OUTPATIENT)
Dept: RADIOLOGY | Facility: HOSPITAL | Age: 57
Discharge: HOME | End: 2022-02-01
Attending: SURGERY
Payer: COMMERCIAL

## 2022-02-01 DIAGNOSIS — D35.00 PHEOCHROMOCYTOMA, UNSPECIFIED LATERALITY: ICD-10-CM

## 2022-02-01 DIAGNOSIS — C74.92: ICD-10-CM

## 2022-02-01 DIAGNOSIS — D35.02 PHEOCHROMOCYTOMA OF LEFT ADRENAL GLAND: ICD-10-CM

## 2022-02-01 PROCEDURE — 78803 RP LOCLZJ TUM SPECT 1 AREA: CPT | Mod: 26,76,, | Performed by: STUDENT IN AN ORGANIZED HEALTH CARE EDUCATION/TRAINING PROGRAM

## 2022-02-01 PROCEDURE — 78804 NM TUMOR LOCALIZATION MULTI MIBG: ICD-10-PCS | Mod: 26,,, | Performed by: STUDENT IN AN ORGANIZED HEALTH CARE EDUCATION/TRAINING PROGRAM

## 2022-02-01 PROCEDURE — 78803 RP LOCLZJ TUM SPECT 1 AREA: CPT | Mod: TC

## 2022-02-01 PROCEDURE — 78803 NM TUMOR LOCALIZATION SPECT: ICD-10-PCS | Mod: 26,76,, | Performed by: STUDENT IN AN ORGANIZED HEALTH CARE EDUCATION/TRAINING PROGRAM

## 2022-02-01 PROCEDURE — 78804 RP LOCLZJ TUM WHBDY 2+D IMG: CPT | Mod: 26,,, | Performed by: STUDENT IN AN ORGANIZED HEALTH CARE EDUCATION/TRAINING PROGRAM

## 2022-02-01 PROCEDURE — 78804 RP LOCLZJ TUM WHBDY 2+D IMG: CPT | Mod: TC

## 2022-02-02 ENCOUNTER — PATIENT MESSAGE (OUTPATIENT)
Dept: NEUROLOGY | Facility: HOSPITAL | Age: 57
End: 2022-02-02
Payer: COMMERCIAL

## 2022-02-02 ENCOUNTER — HOSPITAL ENCOUNTER (OUTPATIENT)
Dept: RADIOLOGY | Facility: HOSPITAL | Age: 57
Discharge: HOME | End: 2022-02-02
Attending: SURGERY
Payer: COMMERCIAL

## 2022-02-04 ENCOUNTER — TELEPHONE (OUTPATIENT)
Dept: NEUROLOGY | Facility: HOSPITAL | Age: 57
End: 2022-02-04

## 2022-02-04 ENCOUNTER — PATIENT MESSAGE (OUTPATIENT)
Dept: NEUROLOGY | Facility: HOSPITAL | Age: 57
End: 2022-02-04

## 2022-02-04 ENCOUNTER — OFFICE VISIT (OUTPATIENT)
Dept: NEUROLOGY | Facility: HOSPITAL | Age: 57
End: 2022-02-04
Attending: SURGERY
Payer: COMMERCIAL

## 2022-02-04 VITALS
HEIGHT: 62 IN | SYSTOLIC BLOOD PRESSURE: 160 MMHG | WEIGHT: 189.13 LBS | TEMPERATURE: 98 F | RESPIRATION RATE: 18 BRPM | HEART RATE: 72 BPM | DIASTOLIC BLOOD PRESSURE: 87 MMHG | BODY MASS INDEX: 34.8 KG/M2

## 2022-02-04 DIAGNOSIS — C7B.8 METASTATIC MALIGNANT NEUROENDOCRINE TUMOR TO LYMPH NODE: Primary | ICD-10-CM

## 2022-02-04 PROCEDURE — 99214 OFFICE O/P EST MOD 30 MIN: CPT | Performed by: SURGERY

## 2022-02-04 RX ORDER — MUPIROCIN 20 MG/G
OINTMENT TOPICAL
COMMUNITY
Start: 2022-02-03

## 2022-02-04 RX ORDER — DILTIAZEM HYDROCHLORIDE 60 MG/1
60 TABLET, FILM COATED ORAL 2 TIMES DAILY
Qty: 60 TABLET | Refills: 11 | Status: SHIPPED | OUTPATIENT
Start: 2022-02-04 | End: 2023-02-04

## 2022-02-04 RX ORDER — LISINOPRIL 20 MG/1
TABLET ORAL
COMMUNITY
Start: 2022-02-03 | End: 2022-03-09 | Stop reason: SDUPTHER

## 2022-02-04 NOTE — TELEPHONE ENCOUNTER
----- Message from Sylvia Prabhakar MA sent at 2/4/2022  1:36 PM CST -----  Regarding: EGD for 3/7/22  Hi All,    Patient was seen in clinic on today with Dr. Mckeon. After further discussion patient has decided that she does not want to proceed with the EGD on 3/7/22. Please cancel patients procedure, and call her with any additional questions.    Thanks,  Sylvia-

## 2022-02-04 NOTE — TELEPHONE ENCOUNTER
Pt acknowledged request to postpone egd scheduled on Monday, March 7, 2022.  Attempted to schedule 3 week audio appt with Dr. Mauro to review symptoms, pt declined.  Pt to contact clinic as needed.

## 2022-02-04 NOTE — PATIENT INSTRUCTIONS
Scans: CT Abd/Pelvis due ASAP  ---  due ASAP    Medications:   - Compazine 10mg  b.i.d.  - Carafate liquid 1 g q.i.d.  - Cardizem 60 mg b.i..  - Continue BP meds lisinopril 20mg b.i.d.    Tumor Board: will discuss your case at Tumor Board after the CT is done and call you back with recommendations

## 2022-02-04 NOTE — PROGRESS NOTES
"NOLANETS:  [facility]  A collaboration between [facility] and [facility]      PATIENT: Marcella Chen  MRN: [id number]  DATE: 2/4/2022    Subjective:      Chief Complaint: Follow-up.  Complains of persistent nausea and episodic hypertension.      Vitals: Blood pressure (!) 160/87, pulse 72, temperature 98.1 °F (36.7 °C), temperature source Oral, resp. rate 18, height 5' 2" (1.575 m), weight 85.8 kg (189 lb 2.5 oz). --    ECOG Score: 1 - Symptomatic but completely ambulatory    Diagnosis:   1. 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 Ran out of pain meds, zofran does not work. She says he gets this every time she has an operation.  Still c/o episodic nausea and episodic hypertension. 29 lb wt loss since surgery.  Nausea did not seem to have relation to meals.  Had an episode of blood pressure 169/129 yesterday.  Improved after resuming blood pressure medication.  Was to have an MIBG scan done today.  MIBG not done, no isotope available.    S/p:   10/25/2021 - Lymphadenectomy, Retroperitoneum and Laparotomy, Exploratory     Pathology:   1. Infrarenal periaortic lymph node #1:  Metastatic pheochromocytoma involving one of one lymph node (1/1).  2. Recurrent pheochromocytoma vs fat:  Fibroadipose tissue, with no diagnostic abnormality.  Negative for tumor.  3. Infrarenal periaortic lymph node #2:  Metastatic pheochromocytoma involving one of one lymph node (1/1).  4. Suprarenal periaortic lymph node:  Metastatic pheochromocytoma involving fibroadipose tissue and three of three lymph nodes (3/3).  5. Additional periaortic lymph node:  Metastatic pheochromocytoma involving one of two lymph nodes (1/2).  6. Additional retroaortic tissue  Fibroadipose tissue, with no diagnostic abnormality.  Negative for tumor.  7. Liver, biopsy:  Moderate " macrovesicular steatosis associated with mildly active steatohepatitis.            Oncologic History:   Oncologic History Pheochromocytoma 2013   Oncologic Treatment Left adrenalectomy  [place]  10/25/2021 left retroperitoneal node dissection for recurrence  Recurrence pheo  22  L perirenal   Pathology 2013 adrenal cortical tumor consistent with pheochromocytoma  Lymph nodes consistent with metastatic pheochromocytoma Ki 67 1%     Past Medical History:  Past Medical History:   Diagnosis Date    Anemia     Arthritis     GERD (gastroesophageal reflux disease)     HLD (hyperlipidemia)     HTN (hypertension)     Hypothyroidism     Labile blood pressure     Migraines     Pheochromocytoma        Past Surgical History:  Past Surgical History:   Procedure Laterality Date    ADRENAL GLAND SURGERY      Pheochromocytoma     SECTION      CHOLECYSTECTOMY      HYSTERECTOMY      with 1 ovary    OOPHORECTOMY      2nd ovary resected    RETROPERITONEAL LYMPHADENECTOMY N/A 10/25/2021    Procedure: LYMPHADENECTOMY, RETROPERITONEUM;  Surgeon: ALEJANDRO Mckeon MD;  Location: [facility];  Service: General;  Laterality: N/A;       Family History:  No family history on file.    Allergies:  Prazosin and Adhesive tape-silicones    Medications:  Current Outpatient Medications   Medication Sig    HYDROcodone-acetaminophen (NORCO) 7.5-325 mg per tablet Take 1 tablet by mouth every 6 (six) hours as needed for Pain.    levothyroxine (SYNTHROID) 50 MCG tablet     lisinopriL (PRINIVIL,ZESTRIL) 20 MG tablet     melatonin (MELATIN)     metoclopramide HCl (REGLAN) 10 MG tablet Take 1 tablet as needed every 8 hours for nausea    mupirocin (BACTROBAN) 2 % ointment     OMEPRAZOLE ORAL     ondansetron (ZOFRAN) 8 MG tablet Take 1 tablet (8 mg total) by mouth every 8 (eight) hours as needed for Nausea (Take 1 tablet every 8 hours as needed for nausea.).    pantoprazole (PROTONIX) 40 MG tablet  Take 1 tablet (40 mg total) by mouth once daily.    PENNSAID 20 mg/gram /actuation(2 %) sopm APPLY 2 PUMPS TO THE AFFECTED AREA TWICE DAILY    prochlorperazine (COMPAZINE) 10 MG tablet Take 1 tablet (10 mg total) by mouth 3 (three) times daily as needed (As needed for Nausea).    promethazine (PHENERGAN) 12.5 MG Tab Take 1 tablet (12.5 mg total) by mouth every 6 (six) hours as needed (As needed for Nausea).     No current facility-administered medications for this visit.        Review of Systems   Constitutional: Positive for activity change, appetite change and fatigue. Negative for chills, fever and unexpected weight change.   HENT: Negative.  Negative for congestion, ear pain, rhinorrhea and sinus pressure.    Eyes: Negative.  Negative for photophobia, pain and redness.   Respiratory: Negative.  Negative for cough, chest tightness, shortness of breath and wheezing.    Cardiovascular: Negative for chest pain, palpitations and leg swelling.        Recurrence of hypertension   Gastrointestinal: Positive for nausea. Negative for abdominal distention, abdominal pain, anal bleeding, blood in stool, constipation, diarrhea, rectal pain and vomiting.   Endocrine: Negative.  Negative for cold intolerance, heat intolerance, polydipsia, polyphagia and polyuria.   Genitourinary: Negative.  Negative for difficulty urinating, dysuria and frequency.   Musculoskeletal: Positive for arthralgias. Negative for back pain, gait problem, myalgias, neck pain and neck stiffness.   Skin: Negative.  Negative for color change, pallor and rash.   Allergic/Immunologic: Negative.  Negative for environmental allergies, food allergies and immunocompromised state.   Neurological: Negative.  Negative for dizziness, seizures, syncope, weakness and light-headedness.   Hematological: Negative.  Negative for adenopathy. Does not bruise/bleed easily.   Psychiatric/Behavioral: Negative.  Negative for agitation, behavioral problems, confusion,  decreased concentration, dysphoric mood, hallucinations, self-injury, sleep disturbance and suicidal ideas. The patient is not nervous/anxious and is not hyperactive.       Objective:      Physical Exam  Vitals and nursing note reviewed.   Constitutional:       Appearance: She is well-developed and well-nourished.   HENT:      Head: Normocephalic and atraumatic.      Mouth/Throat:      Mouth: Oropharynx is clear and moist.   Eyes:      General: No scleral icterus.     Extraocular Movements: EOM normal.      Pupils: Pupils are equal, round, and reactive to light.   Neck:      Thyroid: No thyromegaly.   Cardiovascular:      Rate and Rhythm: Normal rate and regular rhythm.      Pulses: Intact distal pulses.   Pulmonary:      Effort: Pulmonary effort is normal. No respiratory distress.      Breath sounds: No rales.   Abdominal:      General: There is no distension.      Palpations: Abdomen is soft. There is no mass.      Tenderness: There is no abdominal tenderness. There is no guarding or rebound.      Hernia: No hernia is present. There is no hernia in the ventral area.   Musculoskeletal:         General: No tenderness or edema. Normal range of motion.      Cervical back: Normal range of motion and neck supple.   Skin:     General: Skin is warm, dry and intact.      Coloration: Skin is not jaundiced or pale.      Findings: No erythema or rash.   Neurological:      Mental Status: She is alert and oriented to person, place, and time.      Cranial Nerves: No cranial nerve deficit.      Deep Tendon Reflexes: Reflexes are normal and symmetric.   Psychiatric:         Mood and Affect: Mood and affect and mood normal.         Behavior: Behavior normal.         Thought Content: Thought content normal.         Judgment: Judgment normal.        Assessment:       1. 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  Benign neoplasm of left adrenal gland    TECHNIQUE:  The patient received appropriate thyroid blockade approximately 1 hour prior to radiopharmaceutical injection.  Approximately 4 and 24/24 hours after the intravenous injection of 10.4 mCi I-123 MIBG, planar whole body images were acquired in the anterior and posterior views.  SPECT/CT images of the neck, chest, and abdomen were also acquired.    COMPARISON:  CT 12/20/2021, MIBG SPECT 10/19/2021    FINDINGS:  Focal abnormal radiotracer uptake within the left para-aortic region at the level of the left renal vein in the region of several surgical clips.  Findings are concerning for a metastatic lymph node in this patient with pheochromocytoma.    Postoperative changes of left adrenalectomy and retroperitoneal lymphadenectomy.    Physiologic uptake within the salivary glands, heart, liver, bowel and urinary tract.  Impression: Focal abnormal radiotracer uptake within the left para-aortic region concerning for a metastatic lymph node in this patient with pheochromocytoma status post resection.    This report was flagged in Epic as abnormal.    Electronically signed by: Stanford Caballero  Date:    02/02/2022  Time:    10:05  NM Tumor Localization Spect  Narrative: EXAMINATION:  NM TUMOR LOCALIZATION MULTI MIBG; NM TUMOR LOCALIZATION SPECT    CLINICAL HISTORY:  .  Benign neoplasm of left adrenal gland    TECHNIQUE:  The patient received appropriate thyroid blockade approximately 1 hour prior to radiopharmaceutical injection.  Approximately 4 and 24/24 hours after the intravenous injection of 10.4 mCi I-123 MIBG, planar whole body images were acquired in the anterior and posterior views.  SPECT/CT images of the neck, chest, and abdomen were also acquired.    COMPARISON:  CT 12/20/2021, MIBG SPECT 10/19/2021    FINDINGS:  Focal abnormal radiotracer uptake within the left para-aortic region at the  level of the left renal vein in the region of several surgical clips.  Findings are concerning for a metastatic lymph node in this patient with pheochromocytoma.    Postoperative changes of left adrenalectomy and retroperitoneal lymphadenectomy.    Physiologic uptake within the salivary glands, heart, liver, bowel and urinary tract.  Impression: Focal abnormal radiotracer uptake within the left para-aortic region concerning for a metastatic lymph node in this patient with pheochromocytoma status post resection.    This report was flagged in Epic as abnormal.    Electronically signed by: Stanford Caballero  Date:    02/02/2022  Time:    10:05  NM Tumor Localization Multi MIBG  Narrative: EXAMINATION:  NM TUMOR LOCALIZATION MULTI MIBG; NM TUMOR LOCALIZATION SPECT    CLINICAL HISTORY:  .  Benign neoplasm of left adrenal gland    TECHNIQUE:  The patient received appropriate thyroid blockade approximately 1 hour prior to radiopharmaceutical injection.  Approximately 4 and 24/24 hours after the intravenous injection of 10.4 mCi I-123 MIBG, planar whole body images were acquired in the anterior and posterior views.  SPECT/CT images of the neck, chest, and abdomen were also acquired.    COMPARISON:  CT 12/20/2021, MIBG SPECT 10/19/2021    FINDINGS:  Focal abnormal radiotracer uptake within the left para-aortic region at the level of the left renal vein in the region of several surgical clips.  Findings are concerning for a metastatic lymph node in this patient with pheochromocytoma.    Postoperative changes of left adrenalectomy and retroperitoneal lymphadenectomy.    Physiologic uptake within the salivary glands, heart, liver, bowel and urinary tract.  Impression: Focal abnormal radiotracer uptake within the left para-aortic region concerning for a metastatic lymph node in this patient with pheochromocytoma status post resection.    This report was flagged in Epic as abnormal.    Electronically signed by: Stanford  Foto  Date:    2022  Time:    10:05         Impression:  Persistent nausea beyond the perioperative period and early satiety  Warrants EGD to investigate. Looks to have early recurrence in a retroperitoneal node.  Episodic hypertension worried about possible recurrence of  Malignant pheo versus essential hypertension    time interval from primary and 1st recurrence was several years in Ki-67 was low at 1%  She was negative on dotatate PET but positive on MIBG scan previously.and now again positive on MIBG  Patient and sister are concerned about her inheritable process.,  her sibling had anal neuroendocrine carcinoma and  after 21 months.  Blood pressure cuff was checked against ours and is accurate.  We cannot control her blood pressure with Minipress as it put her in the hospital with allergic/idiosyncratic reaction  (angioedema)    Plan:     tumor board re: treatment  Options : If surgical candidate would need some type of probe guided surgery ( MIBG)  Vs possible MIBG therapy vs perc ablation with ETOH or energy?   Continue BP meds lisinopril 20 b.i.d.  Patient wants to hold off on EGD at this time  Try Compazine 10mg  b.i.d. and Carafate liquid 1 g q.i.d  Cardizem 60 mg b.i.d.  Triple phase CT abdomen pelvis thin cuts through left renal hilum    30 m review chart/see pt/coord care    AELJANDRO Mckeon MD, FACS  Professor of Surgery, [facility]  Neuroendocrine Surgery, Hepatic/Pancreatic & General Surgery  [address], [address]  [place]  [address]  ph. [phone]; [phone]  fax. [phone]

## 2022-02-10 ENCOUNTER — PATIENT MESSAGE (OUTPATIENT)
Dept: NEUROLOGY | Facility: HOSPITAL | Age: 57
End: 2022-02-10
Payer: COMMERCIAL

## 2022-02-10 DIAGNOSIS — D35.02 PHEOCHROMOCYTOMA OF LEFT ADRENAL GLAND: Primary | ICD-10-CM

## 2022-02-10 RX ORDER — FUROSEMIDE 20 MG/1
20 TABLET ORAL 2 TIMES DAILY
Qty: 60 TABLET | Refills: 11 | Status: SHIPPED | OUTPATIENT
Start: 2022-02-10 | End: 2023-02-10

## 2022-02-10 NOTE — TELEPHONE ENCOUNTER
Patient sent a portal message in concerns to recent weight gain of 1 to 2 pounds daily over the last week. Discussed with Dr. Mckeon, recommended starting on Lasix 20mg daily. Scrip placed and routed to Dr. Mckeon to sign. Patient notified.

## 2022-02-14 ENCOUNTER — HOSPITAL ENCOUNTER (OUTPATIENT)
Dept: RADIOLOGY | Facility: HOSPITAL | Age: 57
Discharge: HOME | End: 2022-02-14
Attending: SURGERY
Payer: COMMERCIAL

## 2022-02-14 DIAGNOSIS — C7B.8 METASTATIC MALIGNANT NEUROENDOCRINE TUMOR TO LYMPH NODE: ICD-10-CM

## 2022-02-14 PROCEDURE — 25500020 PHARM REV CODE 255: Mod: PO | Performed by: SURGERY

## 2022-02-14 PROCEDURE — 74177 CT ABD & PELVIS W/CONTRAST: CPT | Mod: 26,,, | Performed by: RADIOLOGY

## 2022-02-14 PROCEDURE — 74177 CT ABD & PELVIS W/CONTRAST: CPT | Mod: TC,PO

## 2022-02-14 PROCEDURE — 74177 CT ABDOMEN PELVIS WITH CONTRAST: ICD-10-PCS | Mod: 26,,, | Performed by: RADIOLOGY

## 2022-02-14 RX ADMIN — IOHEXOL 100 ML: 350 INJECTION, SOLUTION INTRAVENOUS at 03:02

## 2022-02-15 ENCOUNTER — CONFERENCE (OUTPATIENT)
Dept: NEUROLOGY | Facility: HOSPITAL | Age: 57
End: 2022-02-15
Payer: COMMERCIAL

## 2022-02-15 ENCOUNTER — PATIENT MESSAGE (OUTPATIENT)
Dept: NEUROLOGY | Facility: HOSPITAL | Age: 57
End: 2022-02-15
Payer: COMMERCIAL

## 2022-02-15 NOTE — TELEPHONE ENCOUNTER
[facility]      NEUROENDOCRINE TUMOR MULTIDISCIPLINARY TUMOR BOARD  _____________________________________________________________________    PRESENTER:   ALEJANDRO Mckeon MD     REASON FOR PRESENTATION:  Treatment Plan and Scan Review    ATTENDEES:   Surgery:              MD REECE Tillman MD M. Maluccio, MD  Interventional Radiology - Guillaume Fonseca MD  Nuclear Medicine - Tk Avitia MD  Pathology - Yanci Skinner MD & Rama Iqbal DO  Oncology - Mitchell Levy MD  Gastroenterology - Not Present   Palliative Care - Not Present  Nutritional Support- Tracie Weil-Cavalier, RDN  Research  Nursing    PATIENT STATUS:  Established Patient    PATIENT SUMMARY:  Past Medical History:   Diagnosis Date    Anemia     Arthritis     GERD (gastroesophageal reflux disease)     HLD (hyperlipidemia)     HTN (hypertension)     Hypothyroidism     Labile blood pressure     Migraines     Pheochromocytoma        Past Surgical History:   Procedure Laterality Date    ADRENAL GLAND SURGERY      Pheochromocytoma     SECTION      CHOLECYSTECTOMY      HYSTERECTOMY      with 1 ovary    OOPHORECTOMY      2nd ovary resected    RETROPERITONEAL LYMPHADENECTOMY N/A 10/25/2021    Procedure: LYMPHADENECTOMY, RETROPERITONEUM;  Surgeon: ALEJANDRO Mckeon MD;  Location: [facility];  Service: General;  Laterality: N/A;     ________________________________________________________________    DISCUSSION:  0.8 cm met node posterior the left renal vein on MIBG.      BOARD RECOMMENDATIONS:   MIBG Therapy   If not able to get MIBG Therapy, then surgery consult

## 2022-02-17 ENCOUNTER — PATIENT MESSAGE (OUTPATIENT)
Dept: NEUROLOGY | Facility: HOSPITAL | Age: 57
End: 2022-02-17
Payer: COMMERCIAL

## 2022-02-21 ENCOUNTER — TELEPHONE (OUTPATIENT)
Dept: NEUROLOGY | Facility: HOSPITAL | Age: 57
End: 2022-02-21
Payer: COMMERCIAL

## 2022-02-21 NOTE — TELEPHONE ENCOUNTER
On 2/17/22 Tumor Board recommendations sent to patient via [facility] portal message. Patient replied. Discussed referral to MD Abelardo for possible MIBG therapy. Referral made by patient herself to MD Abelardo. All questions were answered at this time.

## 2022-02-21 NOTE — TELEPHONE ENCOUNTER
Spoke with Yari at [facility] [phone], Option 1. Yari advised that the patient had started on her referral to [facility] on Friday, and it is currently being worked up by their GI dept. GI Dept: Phone [phone], Option 1, and Fax: [phone]. All questions were answered at this time.

## 2022-02-22 DIAGNOSIS — D84.9 IMMUNOSUPPRESSED STATUS: ICD-10-CM

## 2022-02-24 ENCOUNTER — PATIENT MESSAGE (OUTPATIENT)
Dept: NEUROLOGY | Facility: HOSPITAL | Age: 57
End: 2022-02-24
Payer: COMMERCIAL

## 2022-02-27 ENCOUNTER — PATIENT MESSAGE (OUTPATIENT)
Dept: NEUROLOGY | Facility: HOSPITAL | Age: 57
End: 2022-02-27
Payer: COMMERCIAL

## 2022-02-28 ENCOUNTER — PATIENT MESSAGE (OUTPATIENT)
Dept: NEUROLOGY | Facility: HOSPITAL | Age: 57
End: 2022-02-28
Payer: COMMERCIAL

## 2022-03-01 ENCOUNTER — PATIENT MESSAGE (OUTPATIENT)
Dept: NEUROLOGY | Facility: HOSPITAL | Age: 57
End: 2022-03-01

## 2022-03-08 ENCOUNTER — PATIENT MESSAGE (OUTPATIENT)
Dept: NEUROLOGY | Facility: HOSPITAL | Age: 57
End: 2022-03-08

## 2022-03-09 DIAGNOSIS — D35.02 PHEOCHROMOCYTOMA OF LEFT ADRENAL GLAND: Primary | ICD-10-CM

## 2022-03-09 DIAGNOSIS — C74.92: ICD-10-CM

## 2022-03-09 DIAGNOSIS — D35.00 PHEOCHROMOCYTOMA, UNSPECIFIED LATERALITY: ICD-10-CM

## 2022-03-09 RX ORDER — LISINOPRIL 20 MG/1
20 TABLET ORAL 2 TIMES DAILY
Qty: 60 TABLET | Refills: 11 | Status: SHIPPED | OUTPATIENT
Start: 2022-03-09 | End: 2023-03-09

## 2022-03-09 RX ORDER — LISINOPRIL 20 MG/1
20 TABLET ORAL 2 TIMES DAILY
Qty: 60 TABLET | Refills: 11 | Status: SHIPPED | OUTPATIENT
Start: 2022-03-09 | End: 2022-03-09 | Stop reason: SDUPTHER

## 2022-03-09 NOTE — TELEPHONE ENCOUNTER
Received portal message from patient that she needed a refill on her Lisinopril to reflect that she is now taking 20mg 2x daily. New scrip placed and routed to Dr. Mckeon to sign. Patient notified.

## 2022-03-10 ENCOUNTER — PATIENT MESSAGE (OUTPATIENT)
Dept: NEUROLOGY | Facility: HOSPITAL | Age: 57
End: 2022-03-10

## 2022-03-23 ENCOUNTER — PATIENT MESSAGE (OUTPATIENT)
Dept: NEUROLOGY | Facility: HOSPITAL | Age: 57
End: 2022-03-23

## 2022-04-04 ENCOUNTER — PATIENT MESSAGE (OUTPATIENT)
Dept: NEUROLOGY | Facility: HOSPITAL | Age: 57
End: 2022-04-04
Payer: COMMERCIAL

## 2022-04-04 ENCOUNTER — TELEPHONE (OUTPATIENT)
Dept: NEUROLOGY | Facility: HOSPITAL | Age: 57
End: 2022-04-04
Payer: COMMERCIAL

## 2022-04-04 NOTE — TELEPHONE ENCOUNTER
----- Message from Alvin Pulliam sent at 4/4/2022  5:01 PM CDT -----  Contact: [phone]/self  Who Called: PT  Regarding: speak with lorri   Would the patient rather a call back or a response via [facility]? Call back  Best Call Back Number: [phone]   Additional Information: n/a

## 2022-04-05 ENCOUNTER — PATIENT MESSAGE (OUTPATIENT)
Dept: NEUROLOGY | Facility: HOSPITAL | Age: 57
End: 2022-04-05
Payer: COMMERCIAL

## 2022-04-06 ENCOUNTER — TELEPHONE (OUTPATIENT)
Dept: NEUROLOGY | Facility: HOSPITAL | Age: 57
End: 2022-04-06
Payer: COMMERCIAL

## 2022-04-06 NOTE — TELEPHONE ENCOUNTER
----- Message from Alvin Pulliam sent at 4/6/2022  2:38 PM CDT -----  Contact: [phone]/self  Type:  Patient Returning Call    Who Called: pt   Who Left Message for Patient:jade   Does the patient know what this is regarding? unknown  Would the patient rather a call back or a response via [facility]?   Best Call Back Number:[phone]  Additional Information: n/a

## 2022-04-06 NOTE — TELEPHONE ENCOUNTER
Returned patients call. Discussed patients trip to [facility]. Patient was frustrated at the care she received. Patient wanted to come back and see Dr. Mckeon about possible treatment options. All questions were answered at this time.

## 2022-04-13 ENCOUNTER — PATIENT MESSAGE (OUTPATIENT)
Dept: NEUROLOGY | Facility: HOSPITAL | Age: 57
End: 2022-04-13
Payer: COMMERCIAL

## 2022-04-15 ENCOUNTER — PATIENT MESSAGE (OUTPATIENT)
Dept: NEUROLOGY | Facility: HOSPITAL | Age: 57
End: 2022-04-15
Payer: COMMERCIAL

## 2022-04-26 ENCOUNTER — OFFICE VISIT (OUTPATIENT)
Dept: NEUROLOGY | Facility: HOSPITAL | Age: 57
End: 2022-04-26
Attending: SURGERY
Payer: COMMERCIAL

## 2022-04-26 VITALS
WEIGHT: 184.19 LBS | TEMPERATURE: 99 F | SYSTOLIC BLOOD PRESSURE: 124 MMHG | DIASTOLIC BLOOD PRESSURE: 77 MMHG | HEIGHT: 62 IN | BODY MASS INDEX: 33.9 KG/M2 | HEART RATE: 75 BPM

## 2022-04-26 DIAGNOSIS — D35.02 PHEOCHROMOCYTOMA OF LEFT ADRENAL GLAND: ICD-10-CM

## 2022-04-26 DIAGNOSIS — C7B.8 METASTATIC MALIGNANT NEUROENDOCRINE TUMOR TO LYMPH NODE: Primary | ICD-10-CM

## 2022-04-26 PROCEDURE — 99214 OFFICE O/P EST MOD 30 MIN: CPT | Performed by: SURGERY

## 2022-04-26 NOTE — PROGRESS NOTES
"NOLANETS:  [facility]  A collaboration between [facility] and [facility]      PATIENT: Marcella Chen  MRN: [id number]  DATE: 4/26/2022    Subjective:      Chief Complaint: Follow-up.  Complains of persistent nausea and episodic hypertension.      Vitals: Blood pressure 124/77, pulse 75, temperature 98.7 °F (37.1 °C), temperature source Oral, height 5' 2" (1.575 m), weight 83.6 kg (184 lb 3.1 oz). --    ECOG Score: 1 - Symptomatic but completely ambulatory    Diagnosis:   1. 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 Pheochromocytoma of left adrenal gland         Chief Complaint:  post op follow up c/o abdominal pain and nausea. Ran out of pain meds, zofran does not work. She says he gets this every time she has an operation.  Still c/o episodic nausea and episodic hypertension. 29 lb wt loss since surgery.  Nausea did not seem to have relation to meals.  Had an episode of blood pressure 169/129 yesterday.  Improved after resuming blood pressure medication.  Was to have an MIBG scan done today.  MIBG not done, no isotope available.    S/p:   10/25/2021 - Lymphadenectomy, Retroperitoneum and Laparotomy, Exploratory     Pathology:   1. Infrarenal periaortic lymph node #1:  Metastatic pheochromocytoma involving one of one lymph node (1/1).  2. Recurrent pheochromocytoma vs fat:  Fibroadipose tissue, with no diagnostic abnormality.  Negative for tumor.  3. Infrarenal periaortic lymph node #2:  Metastatic pheochromocytoma involving one of one lymph node (1/1).  4. Suprarenal periaortic lymph node:  Metastatic pheochromocytoma involving fibroadipose tissue and three of three lymph nodes (3/3).  5. Additional periaortic lymph node:  Metastatic pheochromocytoma involving one of two lymph nodes (1/2).  6. Additional retroaortic tissue  Fibroadipose tissue, with no diagnostic abnormality.  Negative for tumor.  7. " Liver, biopsy:  Moderate macrovesicular steatosis associated with mildly active steatohepatitis.  TUMO BOARD  DISCUSSION:  0.8 cm met node posterior the left renal vein on MIBG.        BOARD RECOMMENDATIONS:   MIBG Therapy   If not able to get MIBG Therapy, then surgery consult          Oncologic History:   Oncologic History Pheochromocytoma 2013   Oncologic Treatment Left adrenalectomy  [place]  10/25/2021 left retroperitoneal node dissection for recurrence  Recurrence pheo  22  L perirenal   Pathology 2013 adrenal cortical tumor consistent with pheochromocytoma  Lymph nodes consistent with metastatic pheochromocytoma Ki 67 1%     Past Medical History:  Past Medical History:   Diagnosis Date    Anemia     Arthritis     GERD (gastroesophageal reflux disease)     HLD (hyperlipidemia)     HTN (hypertension)     Hypothyroidism     Labile blood pressure     Migraines     Pheochromocytoma        Past Surgical History:  Past Surgical History:   Procedure Laterality Date    ADRENAL GLAND SURGERY      Pheochromocytoma     SECTION      CHOLECYSTECTOMY      HYSTERECTOMY      with 1 ovary    OOPHORECTOMY      2nd ovary resected    RETROPERITONEAL LYMPHADENECTOMY N/A 10/25/2021    Procedure: LYMPHADENECTOMY, RETROPERITONEUM;  Surgeon: ALEJANDRO Mckeon MD;  [facility];  Service: General;  Laterality: N/A;       Family History:  History reviewed. No pertinent family history.    Allergies:  Prazosin and Adhesive tape-silicones    Medications:  Current Outpatient Medications   Medication Sig    diltiaZEM (CARDIZEM) 60 MG tablet Take 1 tablet (60 mg total) by mouth 2 (two) times a day.    furosemide (LASIX) 20 MG tablet Take 1 tablet (20 mg total) by mouth 2 (two) times daily.    HYDROcodone-acetaminophen (NORCO) 7.5-325 mg per tablet Take 1 tablet by mouth every 6 (six) hours as needed for Pain.    levothyroxine (SYNTHROID) 50 MCG tablet     lisinopriL  (PRINIVIL,ZESTRIL) 20 MG tablet Take 1 tablet (20 mg total) by mouth 2 (two) times a day.    melatonin (MELATIN)     mupirocin (BACTROBAN) 2 % ointment     OMEPRAZOLE ORAL     ondansetron (ZOFRAN) 8 MG tablet Take 1 tablet (8 mg total) by mouth every 8 (eight) hours as needed for Nausea (Take 1 tablet every 8 hours as needed for nausea.).    pantoprazole (PROTONIX) 40 MG tablet Take 1 tablet (40 mg total) by mouth once daily.    PENNSAID 20 mg/gram /actuation(2 %) sopm APPLY 2 PUMPS TO THE AFFECTED AREA TWICE DAILY    promethazine (PHENERGAN) 12.5 MG Tab Take 1 tablet (12.5 mg total) by mouth every 6 (six) hours as needed (As needed for Nausea).    metoclopramide HCl (REGLAN) 10 MG tablet Take 1 tablet as needed every 8 hours for nausea     No current facility-administered medications for this visit.        Review of Systems   Constitutional: Positive for activity change, appetite change and fatigue. Negative for chills, fever and unexpected weight change.   HENT: Negative.  Negative for congestion, ear pain, rhinorrhea and sinus pressure.    Eyes: Negative.  Negative for photophobia, pain and redness.   Respiratory: Negative.  Negative for cough, chest tightness, shortness of breath and wheezing.    Cardiovascular: Negative for chest pain, palpitations and leg swelling.        Recurrence of hypertension   Gastrointestinal: Positive for nausea. Negative for abdominal distention, abdominal pain, anal bleeding, blood in stool, constipation, diarrhea, rectal pain and vomiting.   Endocrine: Negative.  Negative for cold intolerance, heat intolerance, polydipsia, polyphagia and polyuria.   Genitourinary: Negative.  Negative for difficulty urinating, dysuria and frequency.   Musculoskeletal: Positive for arthralgias. Negative for back pain, gait problem, myalgias, neck pain and neck stiffness.   Skin: Negative.  Negative for color change, pallor and rash.   Allergic/Immunologic: Negative.  Negative for  environmental allergies, food allergies and immunocompromised state.   Neurological: Negative.  Negative for dizziness, seizures, syncope, weakness and light-headedness.   Hematological: Negative.  Negative for adenopathy. Does not bruise/bleed easily.   Psychiatric/Behavioral: Negative.  Negative for agitation, behavioral problems, confusion, decreased concentration, dysphoric mood, hallucinations, self-injury, sleep disturbance and suicidal ideas. The patient is not nervous/anxious and is not hyperactive.       Objective:      Physical Exam  Vitals and nursing note reviewed.   Constitutional:       Appearance: She is well-developed.   HENT:      Head: Normocephalic and atraumatic.   Eyes:      General: No scleral icterus.     Pupils: Pupils are equal, round, and reactive to light.   Neck:      Thyroid: No thyromegaly.   Cardiovascular:      Rate and Rhythm: Normal rate and regular rhythm.   Pulmonary:      Effort: Pulmonary effort is normal. No respiratory distress.      Breath sounds: No rales.   Abdominal:      General: There is no distension.      Palpations: Abdomen is soft. There is no mass.      Tenderness: There is no abdominal tenderness. There is no guarding or rebound.      Hernia: No hernia is present. There is no hernia in the ventral area.   Musculoskeletal:         General: No tenderness. Normal range of motion.      Cervical back: Normal range of motion and neck supple.   Skin:     General: Skin is warm and dry.      Coloration: Skin is not jaundiced or pale.      Findings: No erythema or rash.   Neurological:      Mental Status: She is alert and oriented to person, place, and time.      Cranial Nerves: No cranial nerve deficit.      Deep Tendon Reflexes: Reflexes are normal and symmetric.   Psychiatric:         Mood and Affect: Mood normal.         Behavior: Behavior normal.         Thought Content: Thought content normal.         Judgment: Judgment normal.        Assessment:       1. Metastatic  malignant neuroendocrine tumor to lymph node    2. Pheochromocytoma of left adrenal gland        Laboratory Data:    Scans:   CT Abdomen Pelvis With Contrast - Triple Phase  Narrative: EXAMINATION:  CT ABDOMEN PELVIS WITH CONTRAST    CLINICAL HISTORY:  Restage; Other secondary neuroendocrine tumors    TECHNIQUE:  Low dose axial images, sagittal and coronal reformations were obtained from the lung bases to the pubic symphysis following the IV administration of 100 mL of Omnipaque 350 and the oral administration of 32 oz water    COMPARISON:  12/20/2021    FINDINGS:  A 3 mm pulmonary nodule is noted within the right lower lobe image 14 sequence 3 stable since 12/20/2021.  In a high-risk patient follow-up in 1 year for size stability would be reasonable    A small defect within the posterior diaphragm medially on the right is suspected suggestive a small fat containing diaphragmatic hernia.    Moderate coronary calcifications are noted.    A tiny hypodensity is noted in the left lobe of the liver just deep to a surgical line of 6 mm of uncertain etiology.  This could be postsurgical.  A peripheral liver metastasis would seem unlikely but difficult to entirely exclude.  Continued follow-up for stability would be suggested.  MRI liver mass protocol evaluation may add additional information if 1 can be performed.  Cholecystectomy clips appear to be present.    A tiny hypodensity is noted within the mid right kidney of 6 mm unchanged too small to accurately categorize but statistically favored relate to a cyst.    Evidence of left adrenalectomy is noted.  The quantity of fluid seen on the prior in the postsurgical bed appears to have decreased with some fluid soft tissue density still in this region spanning 4.7 x 1.8 cm image 53 sequence 3.  Continued follow-up for appropriate total resolution would be suggested.  Of concern however increased uptake was noted on a MIBG PET of 02/02/2022.  This likely correlates with a  lymph node deep to the left renal vein image 58 sequence 6 of 11 mm x 8 mm and is concerning for metastatic disease.    Postsurgical changes are noted within the spine    Evidence of hysterectomy is noted.    A fat containing anterior abdominal wall hernia is noted near the umbilicus a its paracentric to the right at the superior border of the bladder.  Evidence of hysterectomy is noted.  A postsurgical changes are noted within the spine with pedicle screws present.  Impression: 1. The increased uptake on the MIBG PET is favored to correlate with a lymph node deep to the left renal vein concerning for metastasis.  2. Tiny hypodensity in the left lobe of the liver along the periphery adjacent to a surgical line.  This is most likely postsurgical.  A small mass lesion in this region would be difficult to exclude.  Liver mass protocol evaluation may be of use if 1 can be performed.  If not clinical correlation with continued follow-up for size stability would be suggested.  3. Stable 3 mm pulmonary nodule, in this high-risk patient follow-up in 6 months to a year would be reasonable.  4. Postsurgical changes involving the spine    Electronically signed by: Marcus Milton MD  Date:    2022  Time:    18:02           Impression:  Small early recurrence in a retroperitoneal node. retrorenal vein.  Episodic hypertension worried about possible recurrence of  Malignant pheo versus essential hypertension    time interval from primary and 1st recurrence was several years in Ki-67 was low at 1%  She was negative on dotatate PET but positive on MIBG scan previously.and now again positive on MIBG.     Patient and sister are concerned about her inheritable process.,  her sibling had anal neuroendocrine carcinoma and  after 21 months.  Blood pressure cuff was checked against ours and is accurate.  We cannot use Minipress as it put her in the hospital with allergic/idiosyncratic reaction  (angioedema)  Stable BP today  124/77.      Plan:        treatment  Options : If surgical candidate would need some type of probe guided surgery ( MIBG)  Vs possible MIBG therapy   She does not want to [facility] to [facility]  We will need to find another plae that administers MIBG therapy.  Otherwise we will wait for enlartgement and try repeat resection when tumor is a little larger and easier to find.    Continue BP meds lisinopril 20 b.i.d.    Try Compazine 10mg  b.i.d. and Carafate liquid 1 g q.i.d PRN  Cardizem 60 mg b.i.d.  Repeat MIBG scan  RTC 3 months with another CT, thin cuts through renal hilum  Chest CT in 1 year    30 m review chart/see pt/coord care    ALEJANDRO Mckeon MD, FACS  Professor of Surgery, [facility]  Neuroendocrine Surgery, Hepatic/Pancreatic & General Surgery  [address], [address]  [place]  [address]  ph. [phone]; [phone]  fax. [phone]

## 2022-04-26 NOTE — PATIENT INSTRUCTIONS
Tumor Markers: every 3 months  --- due July 2022  Get lab work drawn at least 1 month prior to appointment.    Scans:   CT Chest in 1 year  MIBG in May 2022  CT Abd/Pelvis in 3 months  ---  due July 2022    Medications:   - Continue BP meds lisinopril 20 b.i.d.    Referrals: will look into MIBG therapy in either [facility], [facility], or [facility] with Dr Wolf Cohen    Return Clinic Appointment: pending MIBG scan and referrals

## 2022-05-06 ENCOUNTER — PATIENT MESSAGE (OUTPATIENT)
Dept: NEUROLOGY | Facility: HOSPITAL | Age: 57
End: 2022-05-06
Payer: COMMERCIAL

## 2022-05-08 ENCOUNTER — PATIENT MESSAGE (OUTPATIENT)
Dept: NEUROLOGY | Facility: HOSPITAL | Age: 57
End: 2022-05-08
Payer: COMMERCIAL

## 2022-05-10 ENCOUNTER — PATIENT MESSAGE (OUTPATIENT)
Dept: NEUROLOGY | Facility: HOSPITAL | Age: 57
End: 2022-05-10
Payer: COMMERCIAL

## 2022-05-12 ENCOUNTER — PATIENT MESSAGE (OUTPATIENT)
Dept: NEUROLOGY | Facility: HOSPITAL | Age: 57
End: 2022-05-12
Payer: COMMERCIAL

## 2022-05-13 ENCOUNTER — TELEPHONE (OUTPATIENT)
Dept: NEUROLOGY | Facility: HOSPITAL | Age: 57
End: 2022-05-13
Payer: COMMERCIAL

## 2022-05-13 NOTE — TELEPHONE ENCOUNTER
----- Message from Payton Carrero sent at 5/12/2022  2:47 PM CDT -----  Contact: [phone]  Type:  Needs Medical Advice    Who Called: Pt   Reason for call; regarding if Nurse Sylvia had a Chance to look at the pt's Other messages  Would the patient rather a call back or a response via [employer]? Call back  Best Call Back Number: [phone]

## 2022-05-18 ENCOUNTER — PATIENT MESSAGE (OUTPATIENT)
Dept: NEUROLOGY | Facility: HOSPITAL | Age: 57
End: 2022-05-18
Payer: COMMERCIAL

## 2022-05-21 ENCOUNTER — PATIENT MESSAGE (OUTPATIENT)
Dept: NEUROLOGY | Facility: HOSPITAL | Age: 57
End: 2022-05-21
Payer: COMMERCIAL

## 2022-05-23 ENCOUNTER — PATIENT MESSAGE (OUTPATIENT)
Dept: NEUROLOGY | Facility: HOSPITAL | Age: 57
End: 2022-05-23
Payer: COMMERCIAL

## 2022-05-26 ENCOUNTER — PATIENT MESSAGE (OUTPATIENT)
Dept: NEUROLOGY | Facility: HOSPITAL | Age: 57
End: 2022-05-26
Payer: COMMERCIAL

## 2022-05-31 ENCOUNTER — PATIENT MESSAGE (OUTPATIENT)
Dept: NEUROLOGY | Facility: HOSPITAL | Age: 57
End: 2022-05-31
Payer: COMMERCIAL

## 2022-06-01 ENCOUNTER — HOSPITAL ENCOUNTER (OUTPATIENT)
Dept: RADIOLOGY | Facility: HOSPITAL | Age: 57
Discharge: HOME | End: 2022-06-01
Attending: SURGERY
Payer: COMMERCIAL

## 2022-06-01 DIAGNOSIS — D35.02 PHEOCHROMOCYTOMA OF LEFT ADRENAL GLAND: ICD-10-CM

## 2022-06-01 DIAGNOSIS — C7B.8 METASTATIC MALIGNANT NEUROENDOCRINE TUMOR TO LYMPH NODE: ICD-10-CM

## 2022-06-01 PROCEDURE — 78804 NM TUMOR LOCALIZATION MULTI MIBG: ICD-10-PCS | Mod: 26,,, | Performed by: RADIOLOGY

## 2022-06-01 PROCEDURE — 78803 RP LOCLZJ TUM SPECT 1 AREA: CPT | Mod: 26,76,, | Performed by: RADIOLOGY

## 2022-06-01 PROCEDURE — 78803 NM TUMOR LOCALIZATION SPECT: ICD-10-PCS | Mod: 26,,, | Performed by: RADIOLOGY

## 2022-06-01 PROCEDURE — 78804 RP LOCLZJ TUM WHBDY 2+D IMG: CPT | Mod: 26,,, | Performed by: RADIOLOGY

## 2022-06-01 PROCEDURE — 78804 RP LOCLZJ TUM WHBDY 2+D IMG: CPT | Mod: TC

## 2022-06-01 PROCEDURE — 78803 RP LOCLZJ TUM SPECT 1 AREA: CPT | Mod: TC

## 2022-06-03 ENCOUNTER — PATIENT MESSAGE (OUTPATIENT)
Dept: NEUROLOGY | Facility: HOSPITAL | Age: 57
End: 2022-06-03
Payer: COMMERCIAL

## 2022-06-09 ENCOUNTER — PATIENT MESSAGE (OUTPATIENT)
Dept: NEUROLOGY | Facility: HOSPITAL | Age: 57
End: 2022-06-09
Payer: COMMERCIAL

## 2022-06-10 ENCOUNTER — PATIENT MESSAGE (OUTPATIENT)
Dept: NEUROLOGY | Facility: HOSPITAL | Age: 57
End: 2022-06-10
Payer: COMMERCIAL

## 2022-06-15 ENCOUNTER — PATIENT MESSAGE (OUTPATIENT)
Dept: NEUROLOGY | Facility: HOSPITAL | Age: 57
End: 2022-06-15
Payer: COMMERCIAL

## 2022-06-22 ENCOUNTER — PATIENT MESSAGE (OUTPATIENT)
Dept: NEUROLOGY | Facility: HOSPITAL | Age: 57
End: 2022-06-22
Payer: COMMERCIAL

## 2024-03-19 NOTE — ADDENDUM NOTE
Addended by: FUNMILAYO GÓMEZ on: 1/21/2022 01:23 PM     Modules accepted: Orders    
Addended by: MERLIN SIMMONS on: 1/21/2022 01:24 PM     Modules accepted: Orders    
No

## (undated) DEVICE — SEE MEDLINE ITEM 152622

## (undated) DEVICE — MANIFOLD 4 PORT

## (undated) DEVICE — NDL HYPDRM 23X15

## (undated) DEVICE — Device

## (undated) DEVICE — SUT PROLENE 3-0 30SH

## (undated) DEVICE — SUT PROLENE 3-0 SH DA 36 BL

## (undated) DEVICE — DRESSING AQUACEL SACRAL 9 X 9

## (undated) DEVICE — SUT PROLENE 2-0 36IN MH BLU

## (undated) DEVICE — KIT SURGIFLO HEMOSTATIC MATRIX

## (undated) DEVICE — SUT 4/0 27IN PDS II VIO MON

## (undated) DEVICE — SUPPORT ULNA NERVE PROTECTOR

## (undated) DEVICE — TRAY FOLEY 16FR INFECTION CONT

## (undated) DEVICE — SUT PROLENE 2-0 SH 36IN BLU

## (undated) DEVICE — SUT 1 36IN PDS II VIO MONO

## (undated) DEVICE — SUT PROLENE 3-0 36 MHMH

## (undated) DEVICE — SEE MEDLINE ITEM 157125

## (undated) DEVICE — CLIP LIGATION MEDIUM CLIPS 1

## (undated) DEVICE — GLOVE SURG BIOGEL LATEX SZ 7.5

## (undated) DEVICE — ELECTRODE REM PLYHSV RETURN 9

## (undated) DEVICE — HEMOSTAT SURGICEL 4X8IN

## (undated) DEVICE — SPONGE LAP 18X18 PREWASHED

## (undated) DEVICE — SET DECANTER MEDICHOICE

## (undated) DEVICE — SUT MONOCRYL 3-0 PS-1

## (undated) DEVICE — CATH ALL PUR URTHL 20FR

## (undated) DEVICE — DRAPE INCISE IOBAN 2 23X23IN

## (undated) DEVICE — NDL BIOPSY TRU-CUT 14G 6IN LF

## (undated) DEVICE — SEALER LIGASURE OPEN 5MM 23CM